# Patient Record
Sex: FEMALE | Race: WHITE | Employment: OTHER | RURAL
[De-identification: names, ages, dates, MRNs, and addresses within clinical notes are randomized per-mention and may not be internally consistent; named-entity substitution may affect disease eponyms.]

---

## 2018-05-22 ENCOUNTER — OFFICE VISIT (OUTPATIENT)
Dept: FAMILY MEDICINE CLINIC | Age: 53
End: 2018-05-22

## 2018-05-22 VITALS
HEIGHT: 66 IN | BODY MASS INDEX: 23.46 KG/M2 | OXYGEN SATURATION: 95 % | RESPIRATION RATE: 18 BRPM | DIASTOLIC BLOOD PRESSURE: 86 MMHG | SYSTOLIC BLOOD PRESSURE: 118 MMHG | HEART RATE: 84 BPM | WEIGHT: 146 LBS

## 2018-05-22 DIAGNOSIS — N85.00 ENDOMETRIAL HYPERPLASIA: ICD-10-CM

## 2018-05-22 DIAGNOSIS — Z11.59 NEED FOR HEPATITIS C SCREENING TEST: ICD-10-CM

## 2018-05-22 DIAGNOSIS — I10 ESSENTIAL HYPERTENSION: Primary | ICD-10-CM

## 2018-05-22 DIAGNOSIS — R73.01 IMPAIRED FASTING GLUCOSE: ICD-10-CM

## 2018-05-22 DIAGNOSIS — Z12.39 SCREENING FOR MALIGNANT NEOPLASM OF BREAST: ICD-10-CM

## 2018-05-22 DIAGNOSIS — F51.01 PRIMARY INSOMNIA: ICD-10-CM

## 2018-05-22 RX ORDER — BISMUTH SUBSALICYLATE 262 MG
1 TABLET,CHEWABLE ORAL DAILY
COMMUNITY

## 2018-05-22 RX ORDER — TRAZODONE HYDROCHLORIDE 100 MG/1
TABLET ORAL
COMMUNITY
Start: 2018-05-14 | End: 2018-09-24 | Stop reason: SDUPTHER

## 2018-05-22 RX ORDER — METOPROLOL SUCCINATE 50 MG/1
25 TABLET, EXTENDED RELEASE ORAL DAILY
Qty: 90 TAB | Refills: 3 | Status: SHIPPED | OUTPATIENT
Start: 2018-05-22 | End: 2018-06-12 | Stop reason: SDUPTHER

## 2018-05-22 NOTE — PROGRESS NOTES
Chief Complaint   Patient presents with    New Patient         HPI:       is a 46 y.o. female. She moved here from the [facility] about 2 years ago. She enjoys sailing. Retired from [employer]. HTN: On Metoprolol ER 50 mg daily. History of endometrial hyperplasia s/p D&C in 2016. Has not had a period since. Follows with Earnest Browning NP and Dr. Arsh Morejon in the [facility]. History of scoliosis. Previously wore a back brace. OA: Worse in her hands, both AC joints and ankles. Uses primarily Advil. Insomnia: On Trazodone 100 mg daily. Takes 1/2 at times. New Issues:  Here to establish care. Allergies   Allergen Reactions    Azithromycin Hives    Doxylamine Succinate Hives       Current Outpatient Prescriptions   Medication Sig    traZODone (DESYREL) 100 mg tablet     MILK THISTLE, BULK, by Does Not Apply route.  omega 3-dha-epa-fish oil (FISH OIL) 100-160-1,000 mg cap Take  by mouth.  multivitamin (ONE A DAY) tablet Take 1 Tab by mouth daily.  ibuprofen (ADVIL) 200 mg tablet Take  by mouth every six (6) hours as needed for Pain.  metoprolol (LOPRESSOR) 50 mg tablet Take  by mouth two (2) times a day.  loratadine (CLARITIN) 10 mg tablet Take 10 mg by mouth. No current facility-administered medications for this visit.         Past Medical History:   Diagnosis Date    Essential hypertension     Migraine     Osteoarthritis     Skin cancer     Sun-damaged skin     Sunburn, blistering     Tanning bed exposure        Past Surgical History:   Procedure Laterality Date    HX APPENDECTOMY      HX GYN         Social History     Social History    Marital status:      Spouse name: N/A    Number of children: N/A    Years of education: N/A     Social History Main Topics    Smoking status: Former Smoker     Quit date: 3/1/1996    Smokeless tobacco: Never Used    Alcohol use 0.6 oz/week     1 Glasses of wine per week      Comment: daily    Drug use: No    Sexual activity: Not Asked     Other Topics Concern    None     Social History Narrative       Family History   Problem Relation Age of Onset    Diabetes Father        Above history reviewed. ROS:  Denies fever, chills, cough, chest pain, SOB,  nausea, vomiting, or diarrhea. Denies wt loss, wt gain, hemoptysis, hematochezia or melena. Physical Examination:    /86 (BP 1 Location: Left arm, BP Patient Position: Sitting)  Pulse 84  Resp 18  Ht 5' 6\" (1.676 m)  Wt 146 lb (66.2 kg)  SpO2 95%  BMI 23.57 kg/m2    General: Alert and Ox3, Fluent speech  HEENT:  PERRLA, EOM intact, TMs, turbinates, pharynx normal.  No thyromegaly. No cervical adenopathy. Neck:  Supple, no adenopathy, JVD, mass or bruit  Chest:  Clear to Ausculation, without wheezes, rales, rubs or ronchi  Cardiac: RRR  Abdomen:  +BS, soft, nontender without palpable HSM  Extremities:  No cyanosis, clubbing or edema  Neurologic:  Ambulatory without assist, CN 2-12 grossly intact. Moves all extremities. Skin: no rash  Lymphadenopathy: no cervical or supraclavicular nodes    ASSESSMENT AND PLAN:     1. Essential hypertension  Good control  Continue current regimen   - METABOLIC PANEL, COMPREHENSIVE  - CBC WITH AUTOMATED DIFF  - LIPID PANEL  - TSH 3RD GENERATION  - metoprolol succinate (TOPROL-XL) 50 mg XL tablet; Take 0.5 Tabs by mouth daily. Dispense: 90 Tab; Refill: 3    2. Impaired fasting glucose  - HEMOGLOBIN A1C WITH EAG    3. Primary insomnia  Good control  Continue current regimen     4. Need for hepatitis C screening test  - HEPATITIS C AB    5. Endometrial hyperplasia  S/P D&C. Doing well. 6. Screening for malignant neoplasm of breast  - CRIS MAMMO BI SCREENING INCL CAD;  Future     RTC in 1 year    Marcus Porras NP

## 2018-05-22 NOTE — MR AVS SNAPSHOT
[address] [facility] [phone] Patient: Rika Chahal MRN: [id number] AOU:1/7/4200 Visit Information Date & Time Provider Department Dept. Phone Encounter #  
 5/22/2018  2:00 PM Latoya Jiménez NP [address] [id number] Follow-up Instructions Return in about 1 year (around 5/22/2019). Follow-up and Disposition History Upcoming Health Maintenance Date Due Hepatitis C Screening 1965 DTaP/Tdap/Td series (1 - Tdap) 2/4/1985 PAP AKA CERVICAL CYTOLOGY 2/4/1985 BREAST CANCER SCRN MAMMOGRAM 2/4/2014 FOBT Q 1 YEAR AGE 50-75 2/4/2014 Influenza Age 5 to Adult 8/1/2018 Allergies as of 5/22/2018  Review Complete On: 5/22/2018 By: Latoya Jiménez NP Severity Noted Reaction Type Reactions Azithromycin  03/22/2016    Hives Doxylamine Succinate  03/22/2016    Hives Current Immunizations  Never Reviewed No immunizations on file. Not reviewed this visit You Were Diagnosed With   
  
 Codes Comments Essential hypertension    -  Primary ICD-10-CM: I10 
ICD-9-CM: 401.9 Impaired fasting glucose     ICD-10-CM: R73.01 
ICD-9-CM: 790.21 Primary insomnia     ICD-10-CM: F51.01 
ICD-9-CM: 307.42 Need for hepatitis C screening test     ICD-10-CM: Z11.59 
ICD-9-CM: V73.89 Endometrial hyperplasia     ICD-10-CM: N85.00 ICD-9-CM: 621.30 Screening for malignant neoplasm of breast     ICD-10-CM: Z12.31 
ICD-9-CM: V76.10 Vitals BP Pulse Resp Height(growth percentile) Weight(growth percentile) SpO2  
 118/86 (BP 1 Location: Left arm, BP Patient Position: Sitting) 84 18 5' 6\" (1.676 m) 146 lb (66.2 kg) 95% BMI OB Status Smoking Status 23.57 kg/m2 Having regular periods Former Smoker Vitals History BMI and BSA Data Body Mass Index Body Surface Area  
 23.57 kg/m 2 1.76 m 2 Preferred Pharmacy Pharmacy Name Phone [facility], [facility] [phone] Your Updated Medication List  
  
   
This list is accurate as of 5/22/18  3:52 PM.  Always use your most recent med list. ADVIL 200 mg tablet Generic drug:  ibuprofen Take  by mouth every six (6) hours as needed for Pain. FISH -160-1,000 mg Cap Generic drug:  omega 3-dha-epa-fish oil Take  by mouth.  
  
 loratadine 10 mg tablet Commonly known as:  Dalton Pain Take 10 mg by mouth.  
  
 metoprolol succinate 50 mg XL tablet Commonly known as:  TOPROL-XL Take 0.5 Tabs by mouth daily. MILK THISTLE (BULK)  
by Does Not Apply route. multivitamin tablet Commonly known as:  ONE A DAY Take 1 Tab by mouth daily. traZODone 100 mg tablet Commonly known as:  Jelly Faes Prescriptions Sent to Pharmacy Refills  
 metoprolol succinate (TOPROL-XL) 50 mg XL tablet 3 Sig: Take 0.5 Tabs by mouth daily. Class: Normal  
 Pharmacy: [address] [place] [address], [address] [place] [address] #: [phone] Route: Oral  
  
We Performed the Following CBC WITH AUTOMATED DIFF [73876 CPT(R)] HEMOGLOBIN A1C WITH EAG [60929 CPT(R)] HEPATITIS C AB [41075 CPT(R)] LIPID PANEL [35326 CPT(R)] METABOLIC PANEL, COMPREHENSIVE [05473 CPT(R)] TSH 3RD GENERATION [97261 CPT(R)] Follow-up Instructions Return in about 1 year (around 5/22/2019). To-Do List   
 05/22/2018 Imaging:  CRIS MAMMO BI SCREENING INCL CAD Introducing [facility]! [employer] introduces [employer] patient portal. Now you can access parts of your medical record, email your doctor's office, and request medication refills online. 1. In your internet browser, go to [email or web address] 2. Click on the First Time User? Click Here link in the Sign In box. You will see the New Member Sign Up page. 3. Enter your [facility] Access Code exactly as it appears below. You will not need to use this code after youve completed the sign-up process. If you do not sign up before the expiration date, you must request a new code. · [facility] Access Code: [email or web address] Expires: 8/20/2018  1:45 PM 
 
4. Enter the last four digits of your Social Security Number (xxxx) and Date of Birth (mm/dd/yyyy) as indicated and click Submit. You will be taken to the next sign-up page. 5. Create a [facility] ID. This will be your [facility] login ID and cannot be changed, so think of one that is secure and easy to remember. 6. Create a [facility] password. You can change your password at any time. 7. Enter your Password Reset Question and Answer. This can be used at a later time if you forget your password. 8. Enter your e-mail address. You will receive e-mail notification when new information is available in [address] 9. Click Sign Up. You can now view and download portions of your medical record. 10. Click the Download Summary menu link to download a portable copy of your medical information. If you have questions, please visit the Frequently Asked Questions section of the [facility] website. Remember, [facility] is NOT to be used for urgent needs. For medical emergencies, dial 911. Now available from your iPhone and Android! Please provide this summary of care documentation to your next provider. Your primary care clinician is listed as Salvador Simeon. If you have any questions after today's visit, please call [phone].

## 2018-05-23 LAB
ALBUMIN SERPL-MCNC: 4.9 G/DL (ref 3.5–5.5)
ALBUMIN/GLOB SERPL: 1.9 {RATIO} (ref 1.2–2.2)
ALP SERPL-CCNC: 48 IU/L (ref 39–117)
ALT SERPL-CCNC: 11 IU/L (ref 0–32)
AST SERPL-CCNC: 18 IU/L (ref 0–40)
BASOPHILS # BLD AUTO: 0 X10E3/UL (ref 0–0.2)
BASOPHILS NFR BLD AUTO: 1 %
BILIRUB SERPL-MCNC: 0.3 MG/DL (ref 0–1.2)
BUN SERPL-MCNC: 24 MG/DL (ref 6–24)
BUN/CREAT SERPL: 32 (ref 9–23)
CALCIUM SERPL-MCNC: 9.5 MG/DL (ref 8.7–10.2)
CHLORIDE SERPL-SCNC: 102 MMOL/L (ref 96–106)
CHOLEST SERPL-MCNC: 242 MG/DL (ref 100–199)
CO2 SERPL-SCNC: 25 MMOL/L (ref 18–29)
CREAT SERPL-MCNC: 0.75 MG/DL (ref 0.57–1)
EOSINOPHIL # BLD AUTO: 0 X10E3/UL (ref 0–0.4)
EOSINOPHIL NFR BLD AUTO: 1 %
ERYTHROCYTE [DISTWIDTH] IN BLOOD BY AUTOMATED COUNT: 13.2 % (ref 12.3–15.4)
EST. AVERAGE GLUCOSE BLD GHB EST-MCNC: 126 MG/DL
GFR SERPLBLD CREATININE-BSD FMLA CKD-EPI: 105 ML/MIN/1.73
GFR SERPLBLD CREATININE-BSD FMLA CKD-EPI: 91 ML/MIN/1.73
GLOBULIN SER CALC-MCNC: 2.6 G/DL (ref 1.5–4.5)
GLUCOSE SERPL-MCNC: 90 MG/DL (ref 65–99)
HBA1C MFR BLD: 6 % (ref 4.8–5.6)
HCT VFR BLD AUTO: 41.6 % (ref 34–46.6)
HCV AB S/CO SERPL IA: <0.1 S/CO RATIO (ref 0–0.9)
HDLC SERPL-MCNC: 92 MG/DL
HGB BLD-MCNC: 14.1 G/DL (ref 11.1–15.9)
IMM GRANULOCYTES # BLD: 0 X10E3/UL (ref 0–0.1)
IMM GRANULOCYTES NFR BLD: 0 %
LDLC SERPL CALC-MCNC: 135 MG/DL (ref 0–99)
LYMPHOCYTES # BLD AUTO: 1.5 X10E3/UL (ref 0.7–3.1)
LYMPHOCYTES NFR BLD AUTO: 34 %
MCH RBC QN AUTO: 31.9 PG (ref 26.6–33)
MCHC RBC AUTO-ENTMCNC: 33.9 G/DL (ref 31.5–35.7)
MCV RBC AUTO: 94 FL (ref 79–97)
MONOCYTES # BLD AUTO: 0.3 X10E3/UL (ref 0.1–0.9)
MONOCYTES NFR BLD AUTO: 7 %
NEUTROPHILS # BLD AUTO: 2.5 X10E3/UL (ref 1.4–7)
NEUTROPHILS NFR BLD AUTO: 57 %
PLATELET # BLD AUTO: 277 X10E3/UL (ref 150–379)
POTASSIUM SERPL-SCNC: 4.2 MMOL/L (ref 3.5–5.2)
PROT SERPL-MCNC: 7.5 G/DL (ref 6–8.5)
RBC # BLD AUTO: 4.42 X10E6/UL (ref 3.77–5.28)
SODIUM SERPL-SCNC: 141 MMOL/L (ref 134–144)
TRIGL SERPL-MCNC: 74 MG/DL (ref 0–149)
TSH SERPL DL<=0.005 MIU/L-ACNC: 1.4 UIU/ML (ref 0.45–4.5)
VLDLC SERPL CALC-MCNC: 15 MG/DL (ref 5–40)
WBC # BLD AUTO: 4.3 X10E3/UL (ref 3.4–10.8)

## 2018-05-24 ENCOUNTER — TELEPHONE (OUTPATIENT)
Dept: FAMILY MEDICINE CLINIC | Age: 53
End: 2018-05-24

## 2018-05-24 NOTE — PROGRESS NOTES
Blood count looks good. Liver, kidneys and electrolytes look good. Cholesterol is elevated. Not high enough to start a statin. Watch diet. Thyroid level is great. Hemoglobin A1c is in the prediabetic range. Watch diet including sugars, carbs and starches. Negative for Hep C.

## 2018-05-24 NOTE — TELEPHONE ENCOUNTER
----- Message from Fede Burt NP sent at 5/24/2018  8:28 AM EDT -----  Blood count looks good. Liver, kidneys and electrolytes look good. Cholesterol is elevated. Not high enough to start a statin. Watch diet. Thyroid level is great. Hemoglobin A1c is in the prediabetic range. Watch diet including sugars, carbs and starches. Negative for Hep C.

## 2018-06-12 DIAGNOSIS — I10 ESSENTIAL HYPERTENSION: ICD-10-CM

## 2018-06-12 RX ORDER — METOPROLOL SUCCINATE 50 MG/1
50 TABLET, EXTENDED RELEASE ORAL DAILY
Qty: 90 TAB | Refills: 3 | Status: SHIPPED | OUTPATIENT
Start: 2018-06-12 | End: 2019-03-01 | Stop reason: SDUPTHER

## 2018-09-24 ENCOUNTER — PATIENT MESSAGE (OUTPATIENT)
Dept: FAMILY MEDICINE CLINIC | Age: 53
End: 2018-09-24

## 2018-09-24 RX ORDER — TRAZODONE HYDROCHLORIDE 100 MG/1
100 TABLET ORAL
Qty: 90 TAB | Refills: 4 | Status: SHIPPED | OUTPATIENT
Start: 2018-09-24 | End: 2019-10-21 | Stop reason: SDUPTHER

## 2018-09-24 NOTE — TELEPHONE ENCOUNTER
From: Navdeep Molina  To: Burak Aguirre NP  Sent: 9/24/2018 3:45 PM EDT  Subject: Prescription Question    Hi Elizabeth  Can you please renew my prescription for Trazadone 100 mg through [facility]? Thank you!   Vivienne Phoenix

## 2019-10-28 RX ORDER — TRAZODONE HYDROCHLORIDE 100 MG/1
TABLET ORAL
Qty: 90 TAB | Refills: 4 | Status: SHIPPED | OUTPATIENT
Start: 2019-10-28 | End: 2020-05-01 | Stop reason: SDUPTHER

## 2021-03-22 ENCOUNTER — PATIENT MESSAGE (OUTPATIENT)
Dept: FAMILY MEDICINE CLINIC | Age: 56
End: 2021-03-22

## 2021-03-22 DIAGNOSIS — F51.01 PRIMARY INSOMNIA: ICD-10-CM

## 2021-03-22 RX ORDER — TRAZODONE HYDROCHLORIDE 100 MG/1
TABLET ORAL
Qty: 90 TAB | Refills: 4 | Status: SHIPPED | OUTPATIENT
Start: 2021-03-22 | End: 2022-03-04

## 2021-03-22 NOTE — TELEPHONE ENCOUNTER
From: Gio Martinez  To: Naida Liao NP  Sent: 3/22/2021 11:42 AM EDT  Subject: Prescription Question    Hi Elizabeth  [facility] messed up my Trazadone script. I should have 1 90 day left but they have deactivated the script. Can you please renew it with [facility]? Thank you! I get my 2nd dose of Moderna on April 1. I plan to make an in person appt to see you in early May for blood work, etc. and to renew my blood pressure med. Thank you!   Alex Blackman

## 2021-05-14 ENCOUNTER — OFFICE VISIT (OUTPATIENT)
Dept: FAMILY MEDICINE CLINIC | Age: 56
End: 2021-05-14
Payer: COMMERCIAL

## 2021-05-14 VITALS
WEIGHT: 145.2 LBS | SYSTOLIC BLOOD PRESSURE: 115 MMHG | BODY MASS INDEX: 22.79 KG/M2 | TEMPERATURE: 97.3 F | HEART RATE: 75 BPM | DIASTOLIC BLOOD PRESSURE: 62 MMHG | OXYGEN SATURATION: 98 % | HEIGHT: 67 IN | RESPIRATION RATE: 22 BRPM

## 2021-05-14 DIAGNOSIS — E55.9 VITAMIN D DEFICIENCY: ICD-10-CM

## 2021-05-14 DIAGNOSIS — R73.01 IMPAIRED FASTING GLUCOSE: ICD-10-CM

## 2021-05-14 DIAGNOSIS — F51.01 PRIMARY INSOMNIA: ICD-10-CM

## 2021-05-14 DIAGNOSIS — I10 ESSENTIAL HYPERTENSION: Primary | ICD-10-CM

## 2021-05-14 DIAGNOSIS — Z23 ENCOUNTER FOR IMMUNIZATION: ICD-10-CM

## 2021-05-14 PROCEDURE — 90750 HZV VACC RECOMBINANT IM: CPT | Performed by: NURSE PRACTITIONER

## 2021-05-14 PROCEDURE — 90471 IMMUNIZATION ADMIN: CPT | Performed by: NURSE PRACTITIONER

## 2021-05-14 PROCEDURE — 99214 OFFICE O/P EST MOD 30 MIN: CPT | Performed by: NURSE PRACTITIONER

## 2021-05-14 RX ORDER — ACETAMINOPHEN 325 MG/1
TABLET ORAL
COMMUNITY

## 2021-05-14 RX ORDER — DIPHENHYDRAMINE HCL 25 MG
25 CAPSULE ORAL
COMMUNITY

## 2021-05-14 RX ORDER — METOPROLOL SUCCINATE 50 MG/1
50 TABLET, EXTENDED RELEASE ORAL DAILY
Qty: 90 TAB | Refills: 4 | Status: SHIPPED | OUTPATIENT
Start: 2021-05-14 | End: 2022-02-28 | Stop reason: SDUPTHER

## 2021-05-14 NOTE — PROGRESS NOTES
Chief Complaint   Patient presents with    Hypertension    Medication Refill     3 most recent PHQ Screens 5/14/2021   Little interest or pleasure in doing things Not at all   Feeling down, depressed, irritable, or hopeless Not at all   Total Score PHQ 2 0     Learning Assessment 5/14/2021   PRIMARY LEARNER Patient   BARRIERS PRIMARY LEARNER -   CO-LEARNER CAREGIVER -   PRIMARY LANGUAGE ENGLISH   LEARNER PREFERENCE PRIMARY READING   LEARNING SPECIAL TOPICS -   ANSWERED BY patient   RELATIONSHIP SELF   ASSESSMENT COMMENT -     Fall Risk Assessment, last 12 mths 5/14/2021   Able to walk? Yes   Fall in past 12 months? 1   Do you feel unsteady? 0   Are you worried about falling 0   Is TUG test greater than 12 seconds? 0   Is the gait abnormal? 0   Number of falls in past 12 months 1   Fall with injury? 1     Abuse Screening Questionnaire 5/14/2021   Do you ever feel afraid of your partner? N   Are you in a relationship with someone who physically or mentally threatens you? N   Is it safe for you to go home? Y     ADL Assessment 5/14/2021   Feeding yourself No Help Needed   Getting from bed to chair No Help Needed   Getting dressed No Help Needed   Bathing or showering No Help Needed   Walk across the room (includes cane/walker) No Help Needed   Using the telphone No Help Needed   Taking your medications No Help Needed   Preparing meals No Help Needed   Managing money (expenses/bills) No Help Needed   Moderately strenuous housework (laundry) No Help Needed   Shopping for personal items (toiletries/medicines) No Help Needed   Shopping for groceries No Help Needed   Driving No Help Needed   Climbing a flight of stairs No Help Needed   Getting to places beyond walking distances No Help Needed     1. Have you been to the ER, urgent care clinic since your last visit? Hospitalized since your last visit? No    2.  Have you seen or consulted any other health care providers outside of the [address] [place] [address] [place] since your last visit? Include any pap smears or colon screening. No      Chief Complaint   Patient presents with    Hypertension    Medication Refill         Visit Vitals  /62 (BP 1 Location: Right arm, BP Patient Position: Sitting, BP Cuff Size: Adult long)   Pulse 75   Temp 97.3 °F (36.3 °C) (Temporal)   Resp 22   Ht 5' 7\" (1.702 m)   Wt 145 lb 3.2 oz (65.9 kg)   LMP 12/31/2015   SpO2 98%   BMI 22.74 kg/m²       Pain Scale: 5/10  Pain Location: Hand (bilateral)    Lashay Myers is a 61 y.o. female presenting for/with:    Hypertension and Medication Refill      Symptom review:    NO  Fever   NO  Shaking chills  NO  Cough  NO  Body aches  NO  Coughing up blood  NO  Chest congestion  NO  Chest pain  NO  Shortness of breath  NO  Profound Loss of smell/taste  NO  Nausea/Vomiting   NO  Loose stool/Diarrhea  NO  any skin issues    Patient Risk Factors Reviewed as follows:  NO  have you been in Close contact with confirmed COVID19 patient   NO  History of recent travel to affected geographical areas within the past 14 days  NO  COPD  NO  Active Cancer/Leukemia/Lymphoma/Chemotherapy  NO  Oral steroid use  NO  Pregnant  NO  Diabetes Mellitus  NO  Heart disease  NO  Asthma  NO Health care worker at home  NO Health care worker  NO Is there a Pregnant Woman in the home  NO Dialysis pt in the home   NO a large number of people living in the home    After obtaining consent, and per orders of CODY. Jeanmarie , injection of Shingrix (left deltoid) given by Nori Gonzalez. Patient instructed to remain in clinic for 20 minutes afterwards, and to report any adverse reaction to me immediately.

## 2021-05-14 NOTE — PROGRESS NOTES
Chief Complaint   Patient presents with    Hypertension    Medication Refill         HPI:       is a 61 y.o. female.  to . Is very active and enjoys biking. HTN: Well controlled on Metoprolol. No side effects. Insomnia: Fair control on Trazodone. Usually only takes 1/2 tab. IFG: Last A1c was 6%. New Issues:  She is due for her Shingrix. Allergies   Allergen Reactions    Azithromycin Hives    Doxylamine Succinate Hives       Current Outpatient Medications   Medication Sig    diphenhydrAMINE (BenadryL) 25 mg capsule Take 25 mg by mouth every six (6) hours as needed.  traZODone (DESYREL) 100 mg tablet TAKE 1 TABLET NIGHTLY    metoprolol succinate (TOPROL-XL) 50 mg XL tablet Take 1 Tab by mouth daily.  MILK THISTLE, BULK, by Does Not Apply route.  multivitamin (ONE A DAY) tablet Take 1 Tab by mouth daily.  ibuprofen (ADVIL) 200 mg tablet Take  by mouth every six (6) hours as needed for Pain.  loratadine (CLARITIN) 10 mg tablet Take 10 mg by mouth.  acetaminophen (TylenoL) 325 mg tablet Take  by mouth every four (4) hours as needed for Pain. No current facility-administered medications for this visit. Past Medical History:   Diagnosis Date    Endometrial hyperplasia     S/P D&C .       Essential hypertension     Hiatal hernia     Menopause     Migraine     Osteoarthritis     Ruptured appendix     Age 31    Skin cancer     Sun-damaged skin     Sunburn, blistering     Tanning bed exposure        Past Surgical History:   Procedure Laterality Date    HX APPENDECTOMY      HX GYN         Social History     Socioeconomic History    Marital status:      Spouse name: Not on file    Number of children: Not on file    Years of education: Not on file    Highest education level: Not on file   Tobacco Use    Smoking status: Former Smoker     Quit date: 3/1/1996     Years since quittin.2    Smokeless tobacco: Never Used Substance and Sexual Activity    Alcohol use: Yes     Alcohol/week: 1.0 standard drinks     Types: 1 Glasses of wine per week     Frequency: 2-3 times a week     Drinks per session: 1 or 2     Binge frequency: Less than monthly     Comment: daily    Drug use: No    Sexual activity: Yes       Family History   Problem Relation Age of Onset    Diabetes Father        Above history reviewed. ROS:  Denies fever, chills, cough, chest pain, SOB,  nausea, vomiting, or diarrhea. Denies wt loss, wt gain, hemoptysis, hematochezia or melena. Physical Examination:    /62 (BP 1 Location: Right arm, BP Patient Position: Sitting, BP Cuff Size: Adult long)   Pulse 75   Temp 97.3 °F (36.3 °C) (Temporal)   Resp 22   Ht 5' 7\" (1.702 m)   Wt 145 lb 3.2 oz (65.9 kg)   LMP 12/31/2015   SpO2 98%   BMI 22.74 kg/m²     General: Alert and Ox3, Fluent speech  HEENT:  PERRLA, EOM intact, TMs, turbinates, pharynx normal.  No thyromegaly. No cervical adenopathy. Neck:  Supple, no adenopathy, JVD, mass or bruit  Chest:  Clear to Ausculation, without wheezes, rales, rubs or ronchi  Cardiac: RRR  Abdomen:  +BS, soft, nontender without palpable HSM  Extremities:  No cyanosis, clubbing or edema  Neurologic:  Ambulatory without assist, CN 2-12 grossly intact. Moves all extremities. Skin: no rash  Lymphadenopathy: no cervical or supraclavicular nodes    ASSESSMENT AND PLAN:     1. Essential hypertension  Good control  Continue current regimen   - METABOLIC PANEL, COMPREHENSIVE; Future  - LIPID PANEL; Future  - HEMOGLOBIN A1C WITH EAG; Future  - VITAMIN D, 25 HYDROXY; Future  - metoprolol succinate (TOPROL-XL) 50 mg XL tablet; Take 1 Tab by mouth daily. Dispense: 90 Tab; Refill: 4  - VITAMIN D, 25 HYDROXY  - HEMOGLOBIN A1C WITH EAG  - LIPID PANEL  - METABOLIC PANEL, COMPREHENSIVE    2. Primary insomnia  Good control  Continue current regimen     3.  Encounter for immunization  - ZOSTER VACC RECOMBINANT ADJUVANTED  - NE IMMUNIZ ADMIN,1 SINGLE/COMB VAC/TOXOID    4. Impaired fasting glucose  - HEMOGLOBIN A1C WITH EAG; Future  - HEMOGLOBIN A1C WITH EAG    5.  Vitamin D deficiency  - VITAMIN D, 25 HYDROXY; Future  - VITAMIN D, 25 HYDROXY     RTC PRN    Rich Shah NP

## 2021-05-15 LAB
25(OH)D3 SERPL-MCNC: 29.6 NG/ML (ref 30–100)
ALBUMIN SERPL-MCNC: 4.2 G/DL (ref 3.5–5)
ALBUMIN/GLOB SERPL: 1.4 {RATIO} (ref 1.1–2.2)
ALP SERPL-CCNC: 55 U/L (ref 45–117)
ALT SERPL-CCNC: 20 U/L (ref 12–78)
ANION GAP SERPL CALC-SCNC: 7 MMOL/L (ref 5–15)
AST SERPL-CCNC: 14 U/L (ref 15–37)
BILIRUB SERPL-MCNC: 0.4 MG/DL (ref 0.2–1)
BUN SERPL-MCNC: 22 MG/DL (ref 6–20)
BUN/CREAT SERPL: 31 (ref 12–20)
CALCIUM SERPL-MCNC: 9 MG/DL (ref 8.5–10.1)
CHLORIDE SERPL-SCNC: 106 MMOL/L (ref 97–108)
CHOLEST SERPL-MCNC: 239 MG/DL
CO2 SERPL-SCNC: 27 MMOL/L (ref 21–32)
CREAT SERPL-MCNC: 0.7 MG/DL (ref 0.55–1.02)
EST. AVERAGE GLUCOSE BLD GHB EST-MCNC: 123 MG/DL
GLOBULIN SER CALC-MCNC: 2.9 G/DL (ref 2–4)
GLUCOSE SERPL-MCNC: 99 MG/DL (ref 65–100)
HBA1C MFR BLD: 5.9 % (ref 4–5.6)
HDLC SERPL-MCNC: 105 MG/DL
HDLC SERPL: 2.3 {RATIO} (ref 0–5)
LDLC SERPL CALC-MCNC: 125.6 MG/DL (ref 0–100)
POTASSIUM SERPL-SCNC: 4.1 MMOL/L (ref 3.5–5.1)
PROT SERPL-MCNC: 7.1 G/DL (ref 6.4–8.2)
SODIUM SERPL-SCNC: 140 MMOL/L (ref 136–145)
TRIGL SERPL-MCNC: 42 MG/DL (ref ?–150)
VLDLC SERPL CALC-MCNC: 8.4 MG/DL

## 2021-05-17 NOTE — PROGRESS NOTES
Vitamin D is slightly now. Add supplement. A1c still in prediabetic range. Improved slightly from last check. Cholesterol about the same. Liver, kidneys and electrolytes are good.

## 2021-09-28 ENCOUNTER — OFFICE VISIT (OUTPATIENT)
Dept: FAMILY MEDICINE CLINIC | Age: 56
End: 2021-09-28
Payer: COMMERCIAL

## 2021-09-28 VITALS
HEART RATE: 65 BPM | DIASTOLIC BLOOD PRESSURE: 80 MMHG | BODY MASS INDEX: 23.54 KG/M2 | TEMPERATURE: 97.8 F | OXYGEN SATURATION: 97 % | RESPIRATION RATE: 16 BRPM | HEIGHT: 67 IN | WEIGHT: 150 LBS | SYSTOLIC BLOOD PRESSURE: 130 MMHG

## 2021-09-28 DIAGNOSIS — R09.81 NASAL CONGESTION: ICD-10-CM

## 2021-09-28 DIAGNOSIS — R51.9 ACUTE NONINTRACTABLE HEADACHE, UNSPECIFIED HEADACHE TYPE: ICD-10-CM

## 2021-09-28 DIAGNOSIS — H10.32 ACUTE BACTERIAL CONJUNCTIVITIS OF LEFT EYE: Primary | ICD-10-CM

## 2021-09-28 PROCEDURE — 99213 OFFICE O/P EST LOW 20 MIN: CPT | Performed by: NURSE PRACTITIONER

## 2021-09-28 RX ORDER — POLYMYXIN B SULFATE AND TRIMETHOPRIM 1; 10000 MG/ML; [USP'U]/ML
1 SOLUTION OPHTHALMIC EVERY 6 HOURS
Qty: 1 EACH | Refills: 0 | Status: SHIPPED | OUTPATIENT
Start: 2021-09-28 | End: 2021-10-08

## 2021-09-28 NOTE — PROGRESS NOTES
.  Chief Complaint   Patient presents with    Pink Eye     watery, itchy \"gritty\". Pt denies any injury. Picked crabs and thinks she may have gotten something in her eye. Sx started on sunday    Cough     dry nonproductive cough.  Nasal Congestion     runny nose     3 most recent PHQ Screens 9/28/2021   Little interest or pleasure in doing things Not at all   Feeling down, depressed, irritable, or hopeless Not at all   Total Score PHQ 2 0     Learning Assessment 9/28/2021   PRIMARY LEARNER Patient   BARRIERS PRIMARY LEARNER -   CO-LEARNER CAREGIVER -   PRIMARY LANGUAGE ENGLISH   LEARNER PREFERENCE PRIMARY READING   LEARNING SPECIAL TOPICS -   ANSWERED BY patient   RELATIONSHIP SELF   ASSESSMENT COMMENT -     Fall Risk Assessment, last 12 mths 9/28/2021   Able to walk? Yes   Fall in past 12 months? 0   Do you feel unsteady? 0   Are you worried about falling 0   Is TUG test greater than 12 seconds? -   Is the gait abnormal? -   Number of falls in past 12 months -   Fall with injury? -     Abuse Screening Questionnaire 9/28/2021   Do you ever feel afraid of your partner? N   Are you in a relationship with someone who physically or mentally threatens you? N   Is it safe for you to go home? Y     ADL Assessment 9/28/2021   Feeding yourself No Help Needed   Getting from bed to chair No Help Needed   Getting dressed No Help Needed   Bathing or showering No Help Needed   Walk across the room (includes cane/walker) No Help Needed   Using the telphone No Help Needed   Taking your medications No Help Needed   Preparing meals No Help Needed   Managing money (expenses/bills) No Help Needed   Moderately strenuous housework (laundry) No Help Needed   Shopping for personal items (toiletries/medicines) No Help Needed   Shopping for groceries No Help Needed   Driving No Help Needed   Climbing a flight of stairs No Help Needed   Getting to places beyond walking distances No Help Needed     1.  Have you been to the ER, urgent care clinic since your last visit? Hospitalized since your last visit? No    2. Have you seen or consulted any other health care providers outside of the [address] [place] [address] since your last visit? Include any pap smears or colon screening. No      Chief Complaint   Patient presents with    Pink Eye     watery, itchy \"gritty\". Pt denies any injury. Picked crabs and thinks she may have gotten something in her eye. Sx started on sunday    Cough     dry nonproductive cough.  Nasal Congestion     runny nose         Visit Vitals  /80 (BP 1 Location: Right arm, BP Patient Position: Sitting, BP Cuff Size: Adult long)   Pulse 65   Temp 97.8 °F (36.6 °C) (Temporal)   Resp 16   Ht 5' 7\" (1.702 m)   Wt 150 lb (68 kg)   LMP 12/31/2015   SpO2 97%   BMI 23.49 kg/m²       Pain Scale: 3/10  Pain Location: Hand (bilateral)    Suraj Blackman is a 61 y.o. female presenting for/with:    Pink Eye (watery, itchy \"gritty\". Pt denies any injury. Picked crabs and thinks she may have gotten something in her eye.  Sx started on sunday), Cough (dry nonproductive cough.), and Nasal Congestion (runny nose)      Symptom review:    NO  Fever   NO  Shaking chills  YES  Cough  NO  Body aches  NO  Coughing up blood  NO  Chest congestion  NO  Chest pain  NO  Shortness of breath  NO  Profound Loss of smell/taste  NO  Nausea/Vomiting   NO  Loose stool/Diarrhea  NO  any skin issues    Patient Risk Factors Reviewed as follows:  NO  have you been in Close contact with confirmed COVID19 patient   NO  History of recent travel to affected geographical areas within the past 14 days  NO  COPD  NO  Active Cancer/Leukemia/Lymphoma/Chemotherapy  NO  Oral steroid use  NO  Pregnant  NO  Diabetes Mellitus  NO  Heart disease  NO  Asthma  NO Health care worker at home  NO Health care worker  NO Is there a Pregnant Woman in the home  NO Dialysis pt in the home   NO a large number of people living in the home    Recent Travel Screening and Travel History documentation     Travel Screening     Question   Response    In the last month, have you been in contact with someone who was confirmed or suspected to have Coronavirus / COVID-19? No / Unsure    Have you had a COVID-19 viral test in the last 14 days? No    Do you have any of the following new or worsening symptoms? Runny nose;Red eye;Cough    Have you traveled internationally or domestically in the last month?   No      Travel History   Travel since 08/28/21     No documented travel since 08/28/21

## 2021-09-28 NOTE — PROGRESS NOTES
Chief Complaint   Patient presents with    Pink Eye     watery, itchy \"gritty\". Pt denies any injury. Picked crabs and thinks she may have gotten something in her eye. Sx started on sunday    Cough     dry nonproductive cough.  Nasal Congestion     runny nose         HPI:       is a 61 y.o. female.  to . Is very active and enjoys biking. HTN: Well controlled on Metoprolol. No side effects. Insomnia: Fair control on Trazodone. Usually only takes 1/2 tab. IFG: Last A1c was 5.9%. Lab Results   Component Value Date/Time    Hemoglobin A1c 5.9 (H) 05/14/2021 08:21 AM      New Issues:  She is here for a sick visit. She started with left eye issues Sunday. Upper lid felt swollen and eye felt \"gritty\". She denies injury. Her eye has become progressively more red and itchy. She now also has a non-productive cough and nasal drainage. Fully vaccinated. No known exposures. Allergies   Allergen Reactions    Azithromycin Hives    Doxylamine Succinate Hives       Current Outpatient Medications   Medication Sig    diphenhydrAMINE (BenadryL) 25 mg capsule Take 25 mg by mouth every six (6) hours as needed.  acetaminophen (TylenoL) 325 mg tablet Take  by mouth every four (4) hours as needed for Pain.  metoprolol succinate (TOPROL-XL) 50 mg XL tablet Take 1 Tab by mouth daily.  traZODone (DESYREL) 100 mg tablet TAKE 1 TABLET NIGHTLY    MILK THISTLE, BULK, by Does Not Apply route.  multivitamin (ONE A DAY) tablet Take 1 Tab by mouth daily.  ibuprofen (ADVIL) 200 mg tablet Take  by mouth every six (6) hours as needed for Pain.  loratadine (CLARITIN) 10 mg tablet Take 10 mg by mouth. No current facility-administered medications for this visit. Past Medical History:   Diagnosis Date    Endometrial hyperplasia     S/P D&C 2016.       Essential hypertension     Hiatal hernia     Menopause     Migraine     Osteoarthritis     Ruptured appendix     Age 31  Skin cancer     Sun-damaged skin     Sunburn, blistering     Tanning bed exposure        Past Surgical History:   Procedure Laterality Date    HX APPENDECTOMY      HX GYN         Social History     Socioeconomic History    Marital status:      Spouse name: Not on file    Number of children: Not on file    Years of education: Not on file    Highest education level: Not on file   Tobacco Use    Smoking status: Former Smoker     Quit date: 3/1/1996     Years since quittin.5    Smokeless tobacco: Never Used   Substance and Sexual Activity    Alcohol use: Yes     Alcohol/week: 1.0 standard drinks     Types: 1 Glasses of wine per week     Comment: daily    Drug use: No    Sexual activity: Yes     Social Determinants of Health     Financial Resource Strain:     Difficulty of Paying Living Expenses:    Food Insecurity:     Worried About Running Out of Food in the Last Year:     [address] in the Last Year:    Transportation Needs:     Lack of Transportation (Medical):  Lack of Transportation (Non-Medical):    Physical Activity:     Days of Exercise per Week:     Minutes of Exercise per Session:    Stress:     Feeling of Stress :    Social Connections:     Frequency of Communication with Friends and Family:     Frequency of Social Gatherings with Friends and Family:     Attends Islam Services:     Active Member of Clubs or Organizations:     Attends Club or Organization Meetings:     Marital Status:        Family History   Problem Relation Age of Onset    Diabetes Father        Above history reviewed. ROS:  Denies fever, chills, POS nasal drainage, POS left eye redness, POS cough, denies chest pain, SOB,  nausea, vomiting, or diarrhea. Denies wt loss, wt gain, hemoptysis, hematochezia or melena.     Physical Examination:    /80 (BP 1 Location: Right arm, BP Patient Position: Sitting, BP Cuff Size: Adult long)   Pulse 65   Temp 97.8 °F (36.6 °C) (Temporal) Resp 16   Ht 5' 7\" (1.702 m)   Wt 150 lb (68 kg)   LMP 12/31/2015   SpO2 97%   BMI 23.49 kg/m²     General: Alert and Ox3, Fluent speech  HEENT:  PERRLA, EOM intact, left conjunctiva with erythema, surrounding soft tissue swelling, TMs, turbinates, pharynx normal.  No thyromegaly. No cervical adenopathy. Neck:  Supple, no adenopathy, JVD, mass or bruit  Chest:  Clear to Ausculation, without wheezes, rales, rubs or ronchi  Cardiac: RRR  Extremities:  No cyanosis, clubbing or edema  Neurologic:  Ambulatory without assist, CN 2-12 grossly intact. Moves all extremities. Skin: no rash  Lymphadenopathy: no cervical or supraclavicular nodes    ASSESSMENT AND PLAN:     1. Acute bacterial conjunctivitis of left eye  Allergic to first line erythromycin   Treating with Bactrim ophthalmic   - trimethoprim-polymyxin b (POLYTRIM) ophthalmic solution; Administer 1 Drop to left eye every six (6) hours for 10 days. Dispense: 1 Each; Refill: 0  - NOVEL CORONAVIRUS (COVID-19)    2. Nasal congestion  R/out COVID  - NOVEL CORONAVIRUS (COVID-19)    3.  Acute nonintractable headache, unspecified headache type  - NOVEL CORONAVIRUS (COVID-19)     RTC PRN    Master Prado NP

## 2021-09-30 LAB
SARS-COV-2, NAA 2 DAY TAT: NORMAL
SARS-COV-2, NAA: NOT DETECTED

## 2021-11-14 ENCOUNTER — PATIENT MESSAGE (OUTPATIENT)
Dept: FAMILY MEDICINE CLINIC | Age: 56
End: 2021-11-14

## 2021-11-15 NOTE — TELEPHONE ENCOUNTER
Kaylin Rincon 11/15/2021 8:34 AM EST      ----- Message -----  From: No Tony  Sent: 11/15/2021 8:29 AM EST  To: [facility]  Subject: Flu shot     Than you. I can't do Thursday as I am out of town.  Is there anything available Friday afternoon after 1pm?

## 2021-11-19 ENCOUNTER — OFFICE VISIT (OUTPATIENT)
Dept: FAMILY MEDICINE CLINIC | Age: 56
End: 2021-11-19
Payer: COMMERCIAL

## 2021-11-19 DIAGNOSIS — Z23 NEEDS FLU SHOT: Primary | ICD-10-CM

## 2021-11-19 PROCEDURE — 90686 IIV4 VACC NO PRSV 0.5 ML IM: CPT | Performed by: NURSE PRACTITIONER

## 2021-11-19 PROCEDURE — 90471 IMMUNIZATION ADMIN: CPT | Performed by: NURSE PRACTITIONER

## 2021-11-19 NOTE — PROGRESS NOTES
After obtaining consent, and per orders of Jeanmarie NP, injection of Flulaval to (R) deltoid given by Jb Cullen. Patient instructed to remain in clinic for 20 minutes afterwards, and to report any adverse reaction to me immediately.

## 2021-11-19 NOTE — PATIENT INSTRUCTIONS
Vaccine Information Statement    Influenza (Flu) Vaccine (Inactivated or Recombinant): What You Need to Know    Many vaccine information statements are available in Latvian and other languages. See www.immunize.org/vis. Hojas de información sobre vacunas están disponibles en español y en muchos otros idiomas. Visite www.immunize.org/vis. 1. Why get vaccinated? Influenza vaccine can prevent influenza (flu). Flu is a contagious disease that spreads around the [facility] every year, usually between October and May. Anyone can get the flu, but it is more dangerous for some people. Infants and young children, people 72 years and older, pregnant people, and people with certain health conditions or a weakened immune system are at greatest risk of flu complications. Pneumonia, bronchitis, sinus infections, and ear infections are examples of flu-related complications. If you have a medical condition, such as heart disease, cancer, or diabetes, flu can make it worse. Flu can cause fever and chills, sore throat, muscle aches, fatigue, cough, headache, and runny or stuffy nose. Some people may have vomiting and diarrhea, though this is more common in children than adults. In an average year, thousands of people in the [facility] die from flu, and many more are hospitalized. Flu vaccine prevents millions of illnesses and flu-related visits to the doctor each year. 2. Influenza vaccines     CDC recommends everyone 6 months and older get vaccinated every flu season. Children 6 months through 6years of age may need 2 doses during a single flu season. Everyone else needs only 1 dose each flu season. It takes about 2 weeks for protection to develop after vaccination. There are many flu viruses, and they are always changing. Each year a new flu vaccine is made to protect against the influenza viruses believed to be likely to cause disease in the upcoming flu season.  Even when the vaccine doesnt exactly match these viruses, it may still provide some protection. Influenza vaccine does not cause flu. Influenza vaccine may be given at the same time as other vaccines. 3. Talk with your health care provider    Tell your vaccination provider if the person getting the vaccine:   Has had an allergic reaction after a previous dose of influenza vaccine, or has any severe, life-threatening allergies    Has ever had Guillain-Barré Syndrome (also called GBS)    In some cases, your health care provider may decide to postpone influenza vaccination until a future visit. Influenza vaccine can be administered at any time during pregnancy. People who are or will be pregnant during influenza season should receive inactivated influenza vaccine. People with minor illnesses, such as a cold, may be vaccinated. People who are moderately or severely ill should usually wait until they recover before getting influenza vaccine. Your health care provider can give you more information. 4. Risks of a vaccine reaction     Soreness, redness, and swelling where the shot is given, fever, muscle aches, and headache can happen after influenza vaccination.  There may be a very small increased risk of Guillain-Barré Syndrome (GBS) after inactivated influenza vaccine (the flu shot). [facility] children who get the flu shot along with pneumococcal vaccine (PCV13) and/or DTaP vaccine at the same time might be slightly more likely to have a seizure caused by fever. Tell your health care provider if a child who is getting flu vaccine has ever had a seizure. People sometimes faint after medical procedures, including vaccination. Tell your provider if you feel dizzy or have vision changes or ringing in the ears. As with any medicine, there is a very remote chance of a vaccine causing a severe allergic reaction, other serious injury, or death. 5. What if there is a serious problem?     An allergic reaction could occur after the vaccinated person leaves the clinic. If you see signs of a severe allergic reaction (hives, swelling of the face and throat, difficulty breathing, a fast heartbeat, dizziness, or weakness), call 9-1-1 and get the person to the nearest hospital.    For other signs that concern you, call your health care provider. Adverse reactions should be reported to the Vaccine Adverse Event Reporting System (VAERS). Your health care provider will usually file this report, or you can do it yourself. Visit the VAERS website at www.vaers. [facility].gov or call 0-656.181.2267. VAERS is only for reporting reactions, and VAERS staff members do not give medical advice. 6. The National Vaccine Injury Compensation Program    The [facility] Vaccine Injury Compensation Program (VICP) is a federal program that was created to compensate people who may have been injured by certain vaccines. Claims regarding alleged injury or death due to vaccination have a time limit for filing, which may be as short as two years. Visit the VICP website at www.[facility].gov/vaccinecompensation or call 7-894.238.4330 to learn about the program and about filing a claim. 7. How can I learn more?  Ask your health care provider.  Call your local or state health department.  Visit the website of the Food and Drug Administration (FDA) for vaccine package inserts and additional information at www.fda.gov/vaccines-blood-biologics/vaccines.  Contact the Centers for Disease Control and Prevention (CDC):  - Call 3-916.907.5095 (1-800-CDC-INFO) or  - Visit CDCs influenza website at www.cdc.gov/flu. Vaccine Information Statement   Inactivated Influenza Vaccine   8/6/2021  42 MELA [address]ton [id number]   Department of Health and Human Services  Centers for Disease Control and Prevention    Office Use Only

## 2022-02-28 DIAGNOSIS — I10 ESSENTIAL HYPERTENSION: ICD-10-CM

## 2022-02-28 RX ORDER — METOPROLOL SUCCINATE 50 MG/1
50 TABLET, EXTENDED RELEASE ORAL DAILY
Qty: 90 TABLET | Refills: 1 | Status: SHIPPED | OUTPATIENT
Start: 2022-02-28 | End: 2022-04-27 | Stop reason: SDUPTHER

## 2022-03-04 DIAGNOSIS — F51.01 PRIMARY INSOMNIA: ICD-10-CM

## 2022-03-04 RX ORDER — TRAZODONE HYDROCHLORIDE 100 MG/1
TABLET ORAL
Qty: 90 TABLET | Refills: 3 | Status: SHIPPED | OUTPATIENT
Start: 2022-03-04

## 2022-03-18 PROBLEM — I10 ESSENTIAL HYPERTENSION: Status: ACTIVE | Noted: 2018-05-22

## 2022-03-18 PROBLEM — F51.01 PRIMARY INSOMNIA: Status: ACTIVE | Noted: 2018-05-22

## 2022-04-26 DIAGNOSIS — I10 ESSENTIAL HYPERTENSION: ICD-10-CM

## 2022-04-27 RX ORDER — METOPROLOL SUCCINATE 50 MG/1
TABLET, EXTENDED RELEASE ORAL
Qty: 90 TABLET | Refills: 3 | Status: SHIPPED | OUTPATIENT
Start: 2022-04-27

## 2022-04-29 ENCOUNTER — OFFICE VISIT (OUTPATIENT)
Dept: FAMILY MEDICINE CLINIC | Age: 57
End: 2022-04-29
Payer: COMMERCIAL

## 2022-04-29 VITALS — OXYGEN SATURATION: 95 % | HEART RATE: 81 BPM | TEMPERATURE: 98.2 F | RESPIRATION RATE: 19 BRPM

## 2022-04-29 DIAGNOSIS — J02.9 SORE THROAT: ICD-10-CM

## 2022-04-29 DIAGNOSIS — R05.9 COUGH: ICD-10-CM

## 2022-04-29 DIAGNOSIS — R09.81 CONGESTION OF NASAL SINUS: Primary | ICD-10-CM

## 2022-04-29 DIAGNOSIS — J40 BRONCHITIS: ICD-10-CM

## 2022-04-29 DIAGNOSIS — R50.9 FEVER, UNSPECIFIED FEVER CAUSE: ICD-10-CM

## 2022-04-29 LAB — SARS-COV-2 POC: NEGATIVE

## 2022-04-29 PROCEDURE — 99213 OFFICE O/P EST LOW 20 MIN: CPT | Performed by: NURSE PRACTITIONER

## 2022-04-29 PROCEDURE — 87426 SARSCOV CORONAVIRUS AG IA: CPT | Performed by: NURSE PRACTITIONER

## 2022-04-29 RX ORDER — PREDNISONE 20 MG/1
20 TABLET ORAL
Qty: 5 TABLET | Refills: 0 | Status: SHIPPED | OUTPATIENT
Start: 2022-04-29

## 2022-04-29 RX ORDER — AMOXICILLIN 500 MG/1
500 CAPSULE ORAL 2 TIMES DAILY
Qty: 20 CAPSULE | Refills: 0 | Status: SHIPPED | OUTPATIENT
Start: 2022-04-29 | End: 2022-05-09

## 2022-04-29 RX ORDER — CODEINE PHOSPHATE AND GUAIFENESIN 10; 100 MG/5ML; MG/5ML
5 SOLUTION ORAL
Qty: 140 ML | Refills: 0 | Status: SHIPPED | OUTPATIENT
Start: 2022-04-29 | End: 2022-05-06

## 2022-04-29 NOTE — PROGRESS NOTES
Rosalba Boateng is a 61 y.o. female presenting for/with:    Chief Complaint   Patient presents with    Nasal Congestion     pt presents with stuffy head, cough,sore throat, fevers between 99.5-99.8, fatigue. Took 2 rapid covid tests wednesday and thursday and both were negative. Sx since tuesday. Visit Vitals  Pulse 81   Temp 98.2 °F (36.8 °C) (Oral)   Resp 19   LMP 12/31/2015   SpO2 95%     Pain Scale: 0 - No pain/10  Pain Location:       3 most recent PHQ Screens 4/29/2022   Little interest or pleasure in doing things Not at all   Feeling down, depressed, irritable, or hopeless Not at all   Total Score PHQ 2 0     Learning Assessment 4/29/2022   PRIMARY LEARNER Patient   BARRIERS PRIMARY LEARNER -   CO-LEARNER CAREGIVER -   PRIMARY LANGUAGE ENGLISH   LEARNER PREFERENCE PRIMARY READING   LEARNING SPECIAL TOPICS -   ANSWERED BY pt   RELATIONSHIP SELF   ASSESSMENT COMMENT -     Fall Risk Assessment, last 12 mths 4/29/2022   Able to walk? Yes   Fall in past 12 months? 0   Do you feel unsteady? 0   Are you worried about falling 0   Is TUG test greater than 12 seconds? -   Is the gait abnormal? -   Number of falls in past 12 months -   Fall with injury? -     Abuse Screening Questionnaire 4/29/2022   Do you ever feel afraid of your partner? N   Are you in a relationship with someone who physically or mentally threatens you? N   Is it safe for you to go home?  Y     ADL Assessment 4/29/2022   Feeding yourself No Help Needed   Getting from bed to chair No Help Needed   Getting dressed No Help Needed   Bathing or showering No Help Needed   Walk across the room (includes cane/walker) No Help Needed   Using the telphone No Help Needed   Taking your medications No Help Needed   Preparing meals No Help Needed   Managing money (expenses/bills) No Help Needed   Moderately strenuous housework (laundry) No Help Needed   Shopping for personal items (toiletries/medicines) No Help Needed   Shopping for groceries No Help Needed Driving No Help Needed   Climbing a flight of stairs No Help Needed   Getting to places beyond walking distances No Help Needed       1. \"Have you been to the ER, urgent care clinic since your last visit? Hospitalized since your last visit? \" No    2. \"Have you seen or consulted any other health care providers outside of the [address] since your last visit? \" No     3. For patients aged 39-70: Has the patient had a colonoscopy / FIT/ Cologuard? No      If the patient is female:    4. For patients aged 41-77: Has the patient had a mammogram within the past 2 years? No      5. For patients aged 21-65: Has the patient had a pap smear? No          Symptom review:    YES  Fever   NO  Shaking chills  YES  Cough  YES  Body aches  NO  Coughing up blood  YES  Chest congestion  NO  Chest pain  NO  Shortness of breath  NO  Profound Loss of smell/taste  NO  Nausea/Vomiting   NO  Loose stool/Diarrhea  NO  any skin issues    Patient Risk Factors Reviewed as follows:  NO  have you been in Close contact with confirmed COVID19 patient   NO  History of recent travel to affected geographical areas within the past 14 days  NO  COPD  NO  Active Cancer/Leukemia/Lymphoma/Chemotherapy  NO  Oral steroid use  NO  Pregnant  NO  Diabetes Mellitus  NO  Heart disease  NO  Asthma  NO Health care worker at home  NO Health care worker  NO Is there a Pregnant Woman in the home  NO Dialysis pt in the home   NO a large number of people living in the home  Recent Travel Screening and Travel History documentation     Travel Screening     Question   Response    In the last 10 days, have you been in contact with someone who was confirmed or suspected to have Coronavirus/COVID-19? Yes    Have you had a COVID-19 viral test in the last 10 days? Yes - Pending result    Do you have any of the following new or worsening symptoms? Fever; Fatigue;Runny nose; Sore throat;Cough; Severe headache;Weakness    Have you traveled internationally or domestically in the last month?   No      Travel History   Travel since 03/29/22    No documented travel since 03/29/22               Advance Care Planning 4/29/2022   Patient's Healthcare Decision Maker is: Legal Next of Kin   Confirm Advance Directive None   Patient Would Like to Complete Advance Directive No      Results for orders placed or performed in visit on 04/29/22   AMB POC SARS-COV-2   Result Value Ref Range    SARS-COV-2 POC Negative Negative

## 2022-04-29 NOTE — PROGRESS NOTES
Chief Complaint   Patient presents with    Nasal Congestion     pt presents with stuffy head, cough,sore throat, fevers between 99.5-99.8, fatigue. Took 2 rapid covid tests wednesday and thursday and both were negative. Sx since tuesday. HPI:       is a 61 y.o. female. New Issues:  She has been sick since Tuesday. Seems to be getting worse. Symptoms include nasal congestion, yellow nasal discharge, low grade fever, severe cough and fatigue. Rapid COVID at home was negative x 2. Allergies   Allergen Reactions    Azithromycin Hives    Doxylamine Succinate Hives       Current Outpatient Medications   Medication Sig    metoprolol succinate (TOPROL-XL) 50 mg XL tablet TAKE 1 TABLET DAILY    traZODone (DESYREL) 100 mg tablet TAKE 1 TABLET NIGHTLY    diphenhydrAMINE (BenadryL) 25 mg capsule Take 25 mg by mouth every six (6) hours as needed.  acetaminophen (TylenoL) 325 mg tablet Take  by mouth every four (4) hours as needed for Pain.  MILK THISTLE, BULK, by Does Not Apply route.  multivitamin (ONE A DAY) tablet Take 1 Tab by mouth daily.  ibuprofen (ADVIL) 200 mg tablet Take  by mouth every six (6) hours as needed for Pain.  loratadine (CLARITIN) 10 mg tablet Take 10 mg by mouth. No current facility-administered medications for this visit. Past Medical History:   Diagnosis Date    Endometrial hyperplasia     S/P D&C .       Essential hypertension     Hiatal hernia     Menopause     Migraine     Osteoarthritis     Ruptured appendix     Age 31    Skin cancer     Sun-damaged skin     Sunburn, blistering     Tanning bed exposure        Past Surgical History:   Procedure Laterality Date    HX APPENDECTOMY      HX GYN         Social History     Socioeconomic History    Marital status:    Tobacco Use    Smoking status: Former Smoker     Quit date: 3/1/1996     Years since quittin.1    Smokeless tobacco: Never Used   Substance and Sexual Activity    Alcohol use: Yes     Alcohol/week: 1.0 standard drink     Types: 1 Glasses of wine per week     Comment: daily    Drug use: No    Sexual activity: Yes       Family History   Problem Relation Age of Onset    Diabetes Father        Above history reviewed. ROS:  POS fever, denies chills, POS cough, denies chest pain, SOB,  nausea, vomiting, or diarrhea. Denies wt loss, wt gain, hemoptysis, hematochezia or melena. Physical Examination:    Pulse 81   Temp 98.2 °F (36.8 °C) (Oral)   Resp 19   LMP 12/31/2015   SpO2 95%     General: Alert and Ox3, Fluent speech  HEENT:  PERRLA, EOM intact, TMs, turbinates, pharynx normal.  No thyromegaly. No cervical adenopathy. Neck:  Supple, no adenopathy, JVD, mass or bruit  Chest:  Expiratory wheeze bilaterally with rhonchi in right lower lobe. Cardiac: RRR  Extremities:  No cyanosis, clubbing or edema  Neurologic:  Ambulatory without assist, CN 2-12 grossly intact. Moves all extremities. Skin: no rash  Lymphadenopathy: no cervical or supraclavicular nodes    Results for orders placed or performed in visit on 04/29/22   AMB POC SARS-COV-2   Result Value Ref Range    SARS-COV-2 POC Negative Negative      ASSESSMENT AND PLAN:     1. Congestion of nasal sinus  Reviewed self care measures:  Fluids  Nasal Saline  Humidification + menthol petroleum (Vicks.)  Postural drainage  NSAID of choice PRN  Avoid decongestants, too drying and difficult to clear respiratory secretions. - AMB POC SARS-COV-2  - predniSONE (DELTASONE) 20 mg tablet; Take 20 mg by mouth daily (with breakfast). Dispense: 5 Tablet; Refill: 0  - amoxicillin (AMOXIL) 500 mg capsule; Take 1 Capsule by mouth two (2) times a day for 10 days. Dispense: 20 Capsule; Refill: 0    2. Sore throat  - AMB POC SARS-COV-2    3. Fever, unspecified fever cause  - AMB POC SARS-COV-2    4. Bronchitis  - predniSONE (DELTASONE) 20 mg tablet; Take 20 mg by mouth daily (with breakfast).   Dispense: 5 Tablet; Refill: 0  - amoxicillin (AMOXIL) 500 mg capsule; Take 1 Capsule by mouth two (2) times a day for 10 days. Dispense: 20 Capsule; Refill: 0    5. Cough  Do not drive after taking   - guaiFENesin-codeine (ROBITUSSIN AC) 100-10 mg/5 mL solution; Take 5 mL by mouth four (4) times daily as needed for Cough for up to 7 days. Max Daily Amount: 20 mL.   Dispense: 140 mL; Refill: 0     RTC PRN     Vinicius Bermudez NP

## 2022-10-18 ENCOUNTER — CLINICAL SUPPORT (OUTPATIENT)
Dept: FAMILY MEDICINE CLINIC | Age: 57
End: 2022-10-18
Payer: COMMERCIAL

## 2022-10-18 DIAGNOSIS — Z23 NEEDS FLU SHOT: Primary | ICD-10-CM

## 2022-10-18 PROCEDURE — 90471 IMMUNIZATION ADMIN: CPT | Performed by: NURSE PRACTITIONER

## 2022-10-18 PROCEDURE — 90686 IIV4 VACC NO PRSV 0.5 ML IM: CPT | Performed by: NURSE PRACTITIONER

## 2022-10-18 NOTE — PATIENT INSTRUCTIONS
Vaccine Information Statement    Influenza (Flu) Vaccine (Inactivated or Recombinant): What You Need to Know    Many vaccine information statements are available in [place] and other languages. See www.immunize.org/vis. Hojas de información sobre vacunas están disponibles en español y en muchos otros idiomas. Visite www.immunize.org/vis. 1. Why get vaccinated? Influenza vaccine can prevent influenza (flu). Flu is a contagious disease that spreads around the [facility] every year, usually between October and May. Anyone can get the flu, but it is more dangerous for some people. Infants and young children, people 72 years and older, pregnant people, and people with certain health conditions or a weakened immune system are at greatest risk of flu complications. Pneumonia, bronchitis, sinus infections, and ear infections are examples of flu-related complications. If you have a medical condition, such as heart disease, cancer, or diabetes, flu can make it worse. Flu can cause fever and chills, sore throat, muscle aches, fatigue, cough, headache, and runny or stuffy nose. Some people may have vomiting and diarrhea, though this is more common in children than adults. In an average year, thousands of people in the [facility] die from flu, and many more are hospitalized. Flu vaccine prevents millions of illnesses and flu-related visits to the doctor each year. 2. Influenza vaccines     CDC recommends everyone 6 months and older get vaccinated every flu season. Children 6 months through 6years of age may need 2 doses during a single flu season. Everyone else needs only 1 dose each flu season. It takes about 2 weeks for protection to develop after vaccination. There are many flu viruses, and they are always changing. Each year a new flu vaccine is made to protect against the influenza viruses believed to be likely to cause disease in the upcoming flu season.  Even when the vaccine doesnt exactly match these viruses, it may still provide some protection. Influenza vaccine does not cause flu. Influenza vaccine may be given at the same time as other vaccines. 3. Talk with your health care provider    Tell your vaccination provider if the person getting the vaccine:  Has had an allergic reaction after a previous dose of influenza vaccine, or has any severe, life-threatening allergies   Has ever had Guillain-Barré Syndrome (also called GBS)    In some cases, your health care provider may decide to postpone influenza vaccination until a future visit. Influenza vaccine can be administered at any time during pregnancy. People who are or will be pregnant during influenza season should receive inactivated influenza vaccine. People with minor illnesses, such as a cold, may be vaccinated. People who are moderately or severely ill should usually wait until they recover before getting influenza vaccine. Your health care provider can give you more information. 4. Risks of a vaccine reaction    Soreness, redness, and swelling where the shot is given, fever, muscle aches, and headache can happen after influenza vaccination. There may be a very small increased risk of Guillain-Barré Syndrome (GBS) after inactivated influenza vaccine (the flu shot). [place] children who get the flu shot along with pneumococcal vaccine (PCV13) and/or DTaP vaccine at the same time might be slightly more likely to have a seizure caused by fever. Tell your health care provider if a child who is getting flu vaccine has ever had a seizure. People sometimes faint after medical procedures, including vaccination. Tell your provider if you feel dizzy or have vision changes or ringing in the ears. As with any medicine, there is a very remote chance of a vaccine causing a severe allergic reaction, other serious injury, or death. 5. What if there is a serious problem?     An allergic reaction could occur after the vaccinated person leaves the clinic. If you see signs of a severe allergic reaction (hives, swelling of the face and throat, difficulty breathing, a fast heartbeat, dizziness, or weakness), call 9-1-1 and get the person to the nearest hospital.    For other signs that concern you, call your health care provider. Adverse reactions should be reported to the Vaccine Adverse Event Reporting System (VAERS). Your health care provider will usually file this report, or you can do it yourself. Visit the VAERS website at www.vaers. [facility].gov or call 1-121.568.4609. VAERS is only for reporting reactions, and VAERS staff members do not give medical advice. 6. The National Vaccine Injury Compensation Program    The [facility] Vaccine Injury Compensation Program (VICP) is a federal program that was created to compensate people who may have been injured by certain vaccines. Claims regarding alleged injury or death due to vaccination have a time limit for filing, which may be as short as two years. Visit the VICP website at www.[facility].gov/vaccinecompensation or call 4-878.368.9841 to learn about the program and about filing a claim. 7. How can I learn more? Ask your health care provider. Call your local or state health department. Visit the website of the Food and Drug Administration (FDA) for vaccine package inserts and additional information at www.fda.gov/vaccines-blood-biologics/vaccines. Contact the Centers for Disease Control and Prevention (CDC): Call 7-797.745.3918 (5-510-QUV-INFO) or  Visit CDCs influenza website at www.cdc.gov/flu. Vaccine Information Statement   Inactivated Influenza Vaccine   8/6/2021  42 MELA Lawson [id number]   Department of Health and Human Services  Centers for Disease Control and Prevention    Office Use Only

## 2022-10-18 NOTE — PROGRESS NOTES
After obtaining consent, and per orders of Jeanmarie Mcclain, injection of Flulaval to (R) deltoid given by Craroll Polo. Patient instructed to remain in clinic for 20 minutes afterwards, and to report any adverse reaction to me immediately.

## 2022-11-07 ENCOUNTER — TRANSCRIBE ORDER (OUTPATIENT)
Dept: SCHEDULING | Age: 57
End: 2022-11-07

## 2022-11-07 DIAGNOSIS — Z12.31 SCREENING MAMMOGRAM FOR HIGH-RISK PATIENT: Primary | ICD-10-CM

## 2022-11-09 LAB — PAP SMEAR, EXTERNAL: NEGATIVE

## 2022-12-01 ENCOUNTER — CLINICAL SUPPORT (OUTPATIENT)
Dept: FAMILY MEDICINE CLINIC | Age: 57
End: 2022-12-01
Payer: COMMERCIAL

## 2022-12-01 DIAGNOSIS — R05.9 COUGH, UNSPECIFIED TYPE: Primary | ICD-10-CM

## 2022-12-01 DIAGNOSIS — Z20.828 EXPOSURE TO RESPIRATORY SYNCYTIAL VIRUS (RSV): ICD-10-CM

## 2022-12-01 LAB
RSV POCT, RSVPOCT: NEGATIVE
VALID INTERNAL CONTROL?: YES

## 2022-12-01 PROCEDURE — 87634 RSV DNA/RNA AMP PROBE: CPT | Performed by: FAMILY MEDICINE

## 2022-12-01 NOTE — PROGRESS NOTES
Results for orders placed or performed in visit on 12/01/22   POC RSV    Result Value Ref Range    VALID INTERNAL CONTROL POC Yes     RSV (POC) Negative Negative     Pt notified. Verb understanding.

## 2022-12-16 ENCOUNTER — HOSPITAL ENCOUNTER (OUTPATIENT)
Dept: GENERAL RADIOLOGY | Age: 57
End: 2022-12-16
Payer: COMMERCIAL

## 2022-12-16 ENCOUNTER — HOSPITAL ENCOUNTER (OUTPATIENT)
Dept: MAMMOGRAPHY | Age: 57
Discharge: HOME | End: 2022-12-16
Payer: COMMERCIAL

## 2022-12-16 ENCOUNTER — TRANSCRIBE ORDER (OUTPATIENT)
Dept: REGISTRATION | Age: 57
End: 2022-12-16

## 2022-12-16 VITALS — WEIGHT: 150 LBS | BODY MASS INDEX: 23.49 KG/M2

## 2022-12-16 DIAGNOSIS — M25.562 LEFT KNEE PAIN: ICD-10-CM

## 2022-12-16 DIAGNOSIS — Z12.31 SCREENING MAMMOGRAM FOR HIGH-RISK PATIENT: ICD-10-CM

## 2022-12-16 DIAGNOSIS — M25.562 LEFT KNEE PAIN: Primary | ICD-10-CM

## 2022-12-16 PROCEDURE — 77063 BREAST TOMOSYNTHESIS BI: CPT

## 2022-12-16 PROCEDURE — 73564 X-RAY EXAM KNEE 4 OR MORE: CPT

## 2023-05-22 RX ORDER — TRAZODONE HYDROCHLORIDE 100 MG/1
100 TABLET ORAL NIGHTLY
Qty: 90 TABLET | Refills: 0 | Status: SHIPPED | OUTPATIENT
Start: 2023-05-22

## 2023-06-13 RX ORDER — TRIAMCINOLONE ACETONIDE 55 UG/1
2 SPRAY, METERED NASAL DAILY
COMMUNITY

## 2023-06-15 ENCOUNTER — ANESTHESIA EVENT (OUTPATIENT)
Facility: HOSPITAL | Age: 58
End: 2023-06-15
Payer: COMMERCIAL

## 2023-06-19 ENCOUNTER — PREP FOR PROCEDURE (OUTPATIENT)
Age: 58
End: 2023-06-19

## 2023-06-19 RX ORDER — SODIUM CHLORIDE 0.9 % (FLUSH) 0.9 %
5-40 SYRINGE (ML) INJECTION EVERY 12 HOURS SCHEDULED
Status: CANCELLED | OUTPATIENT
Start: 2023-06-19

## 2023-06-19 RX ORDER — SODIUM CHLORIDE 9 MG/ML
INJECTION, SOLUTION INTRAVENOUS PRN
Status: CANCELLED | OUTPATIENT
Start: 2023-06-19

## 2023-06-19 RX ORDER — SODIUM CHLORIDE, SODIUM LACTATE, POTASSIUM CHLORIDE, CALCIUM CHLORIDE 600; 310; 30; 20 MG/100ML; MG/100ML; MG/100ML; MG/100ML
INJECTION, SOLUTION INTRAVENOUS CONTINUOUS
Status: CANCELLED | OUTPATIENT
Start: 2023-06-19

## 2023-06-19 RX ORDER — SODIUM CHLORIDE 0.9 % (FLUSH) 0.9 %
5-40 SYRINGE (ML) INJECTION PRN
Status: CANCELLED | OUTPATIENT
Start: 2023-06-19

## 2023-06-19 NOTE — H&P
HPI    78-year-old female who presents as a self-referral but is a patient of Tremaine Guardado for possible screening colonoscopy. She believes her last colonoscopy was in  at which point they found diverticulosis but no polyps. She has no first-degree relative with history of colon cancer. She denies any melena or hematochezia or diarrhea or constipation. She denies any abdominal pain or unexpected weight loss. She has had no change in the caliber of her stool and has had no recent stool testing. She has a history of hypertension and osteoarthritis. She has a history of a ruptured appendix managed by laparoscopic appendectomy as well as a history of a tonsillectomy and D&C. She is not on aspirin or any other blood thinners. She has been vaccinated for COVID. She is a former smoker. Past Medical History:   Diagnosis Date    Endometrial hyperplasia     S/P D&C . Essential hypertension     Hiatal hernia     Menopause     Migraine     Osteoarthritis     Ruptured appendix     Age 31    Skin cancer     Sun-damaged skin     Sunburn, blistering     Tanning bed exposure        Past Surgical History:   Procedure Laterality Date    HX APPENDECTOMY      HX GYN         Social History     Socioeconomic History    Marital status:      Spouse name: Not on file    Number of children: Not on file    Years of education: Not on file    Highest education level: Not on file   Occupational History    Not on file   Tobacco Use    Smoking status: Former     Types: Cigarettes     Quit date: 3/1/1996     Years since quittin.1    Smokeless tobacco: Never   Substance and Sexual Activity    Alcohol use:  Yes     Alcohol/week: 1.0 standard drink     Types: 1 Glasses of wine per week     Comment: daily    Drug use: No    Sexual activity: Yes   Other Topics Concern    Not on file   Social History Narrative    Not on file     Social Determinants of Health     Financial Resource Strain: Not on file   Food

## 2023-06-19 NOTE — H&P (VIEW-ONLY)
Insecurity: Not on file   Transportation Needs: Not on file   Physical Activity: Not on file   Stress: Not on file   Social Connections: Not on file   Intimate Partner Violence: Not on file   Housing Stability: Not on file       Family History   Problem Relation Age of Onset    Diabetes Father        Allergies   Allergen Reactions    Azithromycin Hives    Doxylamine Succinate Hives       Current Outpatient Medications   Medication Sig    traZODone (DESYREL) 100 mg tablet TAKE 1 TABLET NIGHTLY    metoprolol succinate (TOPROL-XL) 50 mg XL tablet TAKE 1 TABLET DAILY    diphenhydrAMINE (BENADRYL) 25 mg capsule Take 1 Capsule by mouth every six (6) hours as needed. acetaminophen (TYLENOL) 325 mg tablet Take  by mouth every four (4) hours as needed for Pain. MILK THISTLE, BULK, by Does Not Apply route. ibuprofen (MOTRIN) 200 mg tablet Take  by mouth every six (6) hours as needed for Pain.    loratadine (CLARITIN) 10 mg tablet Take 1 Tablet by mouth.    predniSONE (DELTASONE) 20 mg tablet Take 20 mg by mouth daily (with breakfast). multivitamin (ONE A DAY) tablet Take 1 Tab by mouth daily. No current facility-administered medications for this visit. The above histories, medications and allergies have been reviewed. ROS    Visit Vitals  /84 (BP 1 Location: Left upper arm, BP Patient Position: Sitting, BP Cuff Size: Adult)   Pulse 72   Temp 97.3 °F (36.3 °C) (Temporal)   Resp 16   Ht 5' 7\" (1.702 m)   Wt 157 lb (71.2 kg)   LMP 12/31/2015   SpO2 96%   BMI 24.59 kg/m²     Physical Exam  Constitutional:       Appearance: Normal appearance. Cardiovascular:      Rate and Rhythm: Normal rate and regular rhythm. Pulmonary:      Effort: No respiratory distress. Breath sounds: Normal breath sounds. No stridor. Abdominal:      General: There is no distension. Palpations: Abdomen is soft. There is no mass. Tenderness: There is no abdominal tenderness.    Neurological:      Mental

## 2023-06-20 ENCOUNTER — ANESTHESIA (OUTPATIENT)
Facility: HOSPITAL | Age: 58
End: 2023-06-20
Payer: COMMERCIAL

## 2023-06-20 ENCOUNTER — HOSPITAL ENCOUNTER (OUTPATIENT)
Facility: HOSPITAL | Age: 58
Setting detail: OUTPATIENT SURGERY
Discharge: HOME | End: 2023-06-20
Attending: SURGERY | Admitting: SURGERY
Payer: COMMERCIAL

## 2023-06-20 VITALS
BODY MASS INDEX: 22.76 KG/M2 | RESPIRATION RATE: 16 BRPM | OXYGEN SATURATION: 96 % | HEART RATE: 61 BPM | WEIGHT: 145 LBS | SYSTOLIC BLOOD PRESSURE: 130 MMHG | TEMPERATURE: 97.7 F | HEIGHT: 67 IN | DIASTOLIC BLOOD PRESSURE: 92 MMHG

## 2023-06-20 PROBLEM — Z12.11 COLON CANCER SCREENING: Status: ACTIVE | Noted: 2023-06-20

## 2023-06-20 PROCEDURE — 2709999900 HC NON-CHARGEABLE SUPPLY: Performed by: SURGERY

## 2023-06-20 PROCEDURE — 3600000012 HC SURGERY LEVEL 2 ADDTL 15MIN: Performed by: SURGERY

## 2023-06-20 PROCEDURE — 6360000002 HC RX W HCPCS: Performed by: ANESTHESIOLOGY

## 2023-06-20 PROCEDURE — 3600000002 HC SURGERY LEVEL 2 BASE: Performed by: SURGERY

## 2023-06-20 PROCEDURE — 45385 COLONOSCOPY W/LESION REMOVAL: CPT | Performed by: SURGERY

## 2023-06-20 PROCEDURE — 2580000003 HC RX 258: Performed by: SURGERY

## 2023-06-20 PROCEDURE — 88305 TISSUE EXAM BY PATHOLOGIST: CPT

## 2023-06-20 PROCEDURE — 7100000001 HC PACU RECOVERY - ADDTL 15 MIN: Performed by: SURGERY

## 2023-06-20 PROCEDURE — 3700000000 HC ANESTHESIA ATTENDED CARE: Performed by: SURGERY

## 2023-06-20 PROCEDURE — 7100000000 HC PACU RECOVERY - FIRST 15 MIN: Performed by: SURGERY

## 2023-06-20 PROCEDURE — 3700000001 HC ADD 15 MINUTES (ANESTHESIA): Performed by: SURGERY

## 2023-06-20 RX ORDER — FENTANYL CITRATE 50 UG/ML
INJECTION, SOLUTION INTRAMUSCULAR; INTRAVENOUS PRN
Status: DISCONTINUED | OUTPATIENT
Start: 2023-06-20 | End: 2023-06-20 | Stop reason: SDUPTHER

## 2023-06-20 RX ORDER — SODIUM CHLORIDE 0.9 % (FLUSH) 0.9 %
5-40 SYRINGE (ML) INJECTION EVERY 12 HOURS SCHEDULED
Status: DISCONTINUED | OUTPATIENT
Start: 2023-06-20 | End: 2023-06-20 | Stop reason: HOSPADM

## 2023-06-20 RX ORDER — SODIUM CHLORIDE 0.9 % (FLUSH) 0.9 %
5-40 SYRINGE (ML) INJECTION PRN
Status: DISCONTINUED | OUTPATIENT
Start: 2023-06-20 | End: 2023-06-20 | Stop reason: HOSPADM

## 2023-06-20 RX ORDER — SODIUM CHLORIDE, SODIUM LACTATE, POTASSIUM CHLORIDE, CALCIUM CHLORIDE 600; 310; 30; 20 MG/100ML; MG/100ML; MG/100ML; MG/100ML
INJECTION, SOLUTION INTRAVENOUS CONTINUOUS
Status: DISCONTINUED | OUTPATIENT
Start: 2023-06-20 | End: 2023-06-20 | Stop reason: HOSPADM

## 2023-06-20 RX ORDER — PROPOFOL 10 MG/ML
INJECTION, EMULSION INTRAVENOUS CONTINUOUS PRN
Status: DISCONTINUED | OUTPATIENT
Start: 2023-06-20 | End: 2023-06-20 | Stop reason: SDUPTHER

## 2023-06-20 RX ORDER — SODIUM CHLORIDE 9 MG/ML
INJECTION, SOLUTION INTRAVENOUS PRN
Status: DISCONTINUED | OUTPATIENT
Start: 2023-06-20 | End: 2023-06-20 | Stop reason: HOSPADM

## 2023-06-20 RX ORDER — PROPOFOL 10 MG/ML
INJECTION, EMULSION INTRAVENOUS PRN
Status: DISCONTINUED | OUTPATIENT
Start: 2023-06-20 | End: 2023-06-20 | Stop reason: SDUPTHER

## 2023-06-20 RX ADMIN — FENTANYL CITRATE 50 MCG: 50 INJECTION, SOLUTION INTRAMUSCULAR; INTRAVENOUS at 09:59

## 2023-06-20 RX ADMIN — PROPOFOL 150 MCG/KG/MIN: 10 INJECTION, EMULSION INTRAVENOUS at 09:41

## 2023-06-20 RX ADMIN — PROPOFOL 100 MG: 10 INJECTION, EMULSION INTRAVENOUS at 09:41

## 2023-06-20 RX ADMIN — SODIUM CHLORIDE, POTASSIUM CHLORIDE, SODIUM LACTATE AND CALCIUM CHLORIDE: 600; 310; 30; 20 INJECTION, SOLUTION INTRAVENOUS at 09:15

## 2023-06-20 RX ADMIN — FENTANYL CITRATE 50 MCG: 50 INJECTION, SOLUTION INTRAMUSCULAR; INTRAVENOUS at 09:47

## 2023-06-20 ASSESSMENT — PAIN SCALES - GENERAL
PAINLEVEL_OUTOF10: 0

## 2023-06-20 ASSESSMENT — PAIN - FUNCTIONAL ASSESSMENT: PAIN_FUNCTIONAL_ASSESSMENT: 0-10

## 2023-06-20 NOTE — OP NOTE
[facility]  OPERATIVE REPORT    Name:  Esteban Oneil  MR#:  [id number]  :  1965  ACCOUNT #:  [de-identified]  DATE OF SERVICE:  2023    PREOPERATIVE DIAGNOSIS:  Screening colonoscopy. POSTOPERATIVE DIAGNOSIS:  Screening colonoscopy. PROCEDURE PERFORMED:  Colonoscopy with hot snare polypectomy. SURGEON:  Santos Santos MD    ASSISTANT:  None    ANESTHESIA:  MAC.    COMPLICATIONS:  None. SPECIMENS REMOVED:  Proximal ascending colon polyp. IMPLANTS:  None    DRAINS:  None. ESTIMATED BLOOD LOSS:  Minimal.    FINDINGS:  1. Diverticulosis. 2.  Proximal ascending colon polyp. DISPOSITION:  Stable. PROCEDURE:  The patient was brought to the operative theater. Monitoring devices and nasal cannula O2 were placed per Anesthesia, and the patient was placed in the left side down decubitus position. IV sedation was administered and a time-out was performed. Digital rectal exam was performed which was essentially normal.  A well-lubricated Olympus pediatric colonoscope was introduced in the patient's rectum and advanced to the cecum. We did need to apply abdominal pressure. The cecum was identified by the ileocecal valve. The prep was adequate to proceed. The scope was carefully withdrawn with mucosal surfaces circumferentially evaluated. The patient had scattered diverticulosis throughout. In the proximal ascending colon, just a few centimeters distal to the ileocecal valve was a small polypoid lesion which was removed in its entirety by hot snare technique. Other than the diverticulosis and the polyp that was removed in proximal ascending colon, no additional abnormalities were noted in the ascending colon, transverse colon, descending colon or sigmoid colon. The scope was pulled back into the rectum and retroflexed which did not reveal any lesions. The scope was straightened out and carefully withdrawn.     The patient tolerated the procedure well and was

## 2023-06-20 NOTE — ANESTHESIA PRE PROCEDURE
Department of Anesthesiology  Preprocedure Note       Name:  Swetha Aguilera   Age:  60 y.o.  :  1965                                          MRN:  [id number]         Date:  2023      Surgeon: Janak Ellison):  Rosa Morerll MD    Procedure: Procedure(s):  COLONOSCOPY    Medications prior to admission:   Prior to Admission medications    Medication Sig Start Date End Date Taking?  Authorizing Provider   triamcinolone (NASACORT) 55 MCG/ACT nasal inhaler 2 sprays by Each Nostril route daily   Yes Historical Provider, MD   Milk Thistle 250 MG CAPS Take by mouth   Yes Historical Provider, MD   traZODone (DESYREL) 100 MG tablet Take 1 tablet by mouth nightly Needs visit for more fills 23   GEOFF Whitfield - NP   acetaminophen (TYLENOL) 325 MG tablet Take by mouth every 4 hours as needed    Ar Automatic Reconciliation   diphenhydrAMINE (BENADRYL) 25 MG capsule Take 1 capsule by mouth every 6 hours as needed  Patient not taking: Reported on 2023    Ar Automatic Reconciliation   ibuprofen (ADVIL;MOTRIN) 200 MG tablet Take by mouth every 6 hours as needed    Ar Automatic Reconciliation   loratadine (CLARITIN) 10 MG tablet Take 1 tablet by mouth    Ar Automatic Reconciliation   metoprolol succinate (TOPROL XL) 50 MG extended release tablet Take 1 tablet by mouth daily 22   Ar Automatic Reconciliation   predniSONE (DELTASONE) 20 MG tablet Take 20 mg by mouth daily (with breakfast)  Patient not taking: Reported on 2023   Ar Automatic Reconciliation       Current medications:    Current Facility-Administered Medications   Medication Dose Route Frequency Provider Last Rate Last Admin    sodium chloride flush 0.9 % injection 5-40 mL  5-40 mL IntraVENous 2 times per day Rosa Morrell MD        sodium chloride flush 0.9 % injection 5-40 mL  5-40 mL IntraVENous PRN Rosa Morrell MD        0.9 % sodium chloride infusion   IntraVENous PRN Rosa Morrell MD        lactated ringers IV

## 2023-06-20 NOTE — BRIEF OP NOTE
Brief Postoperative Note      Patient: Jake Malone  YOB: 1965  MRN: [id number]    Date of Procedure: 6/20/2023    Pre-Op Diagnosis Codes:      * Special screening for malignant neoplasms, colon [Z12.11]    Post-Op Diagnosis: Same       Procedure(s):  COLONOSCOPY WITH HOT SNARE POLYPECTOMY    Surgeon(s):  Saintclair Heinz, MD    Assistant:  * No surgical staff found *    Anesthesia: Monitor Anesthesia Care    Estimated Blood Loss (mL): Minimal    Complications: None    Specimens:   ID Type Source Tests Collected by Time Destination   A : Proximal ascending colon polyp Tissue Colon SURGICAL PATHOLOGY Saintclair Heinz, MD 6/20/2023 1002        Implants:  * No implants in log *      Drains: * No LDAs found *    Findings:   Diverticulosis  Proximal ascending colon polyp        Electronically signed by Caleb Stewart MD on 6/20/2023 at 10:13 AM

## 2023-06-20 NOTE — ANESTHESIA POSTPROCEDURE EVALUATION
Department of Anesthesiology  Postprocedure Note    Patient: Khanh Yip  MRN: [id number]  YOB: 1965  Date of evaluation: 6/20/2023      Procedure Summary     Date: 06/20/23 Room / Location: hospitals MAIN OR 1 / hospitals MAIN OR    Anesthesia Start: 0938 Anesthesia Stop: 6618    Procedure: COLONOSCOPY WITH HOT SNARE POLYPECTOMY (Lower GI Region) Diagnosis:       Special screening for malignant neoplasms, colon      (Special screening for malignant neoplasms, colon [Z12.11])    Surgeons: Mally Urena MD Responsible Provider: Madelyne Leyden, MD    Anesthesia Type: TIVA ASA Status: 2          Anesthesia Type: No value filed.     Gagan Phase I:      Gagan Phase II:        Anesthesia Post Evaluation    Patient location during evaluation: PACU  Patient participation: complete - patient participated  Level of consciousness: awake and alert  Pain score: 0  Nausea & Vomiting: no nausea and no vomiting  Complications: no  Cardiovascular status: blood pressure returned to baseline and hemodynamically stable  Respiratory status: acceptable and room air  Hydration status: euvolemic

## 2023-06-20 NOTE — PROGRESS NOTES
Chief Complaint   Patient presents with    Gynecologic Exam     Completed with Tammie Perez. Letter sent      Hypertension     Routine F/U.          HPI:       is a 60 y.o. female.  to . Is very active and enjoys biking. HTN: Well controlled on Metoprolol. No side effects. Insomnia: Fair control on Trazodone. Usually only takes 1/2 tab. IFG: Last A1c was 5.9%. New Issues:  She recently got her colonoscopy. Went well. Also, had both thumbs injected by Dr. Paul Acosta for OA on 6/22/23. BP was high at her colonoscopy as well. Has not been checking at home. Allergies   Allergen Reactions    Latex      Reports mainly from dental procedure causes respiratory allergies     Azithromycin Hives    Doxylamine Hives       Current Outpatient Medications   Medication Sig Dispense Refill    triamcinolone (NASACORT) 55 MCG/ACT nasal inhaler 2 sprays by Each Nostril route daily      Milk Thistle 250 MG CAPS Take by mouth      traZODone (DESYREL) 100 MG tablet Take 1 tablet by mouth nightly Needs visit for more fills 90 tablet 0    acetaminophen (TYLENOL) 325 MG tablet Take by mouth every 4 hours as needed      diphenhydrAMINE (BENADRYL) 25 MG capsule Take 1 capsule by mouth every 6 hours as needed (Patient not taking: Reported on 6/20/2023)      ibuprofen (ADVIL;MOTRIN) 200 MG tablet Take by mouth every 6 hours as needed      loratadine (CLARITIN) 10 MG tablet Take 1 tablet by mouth      metoprolol succinate (TOPROL XL) 50 MG extended release tablet Take 1 tablet by mouth daily       No current facility-administered medications for this visit. Past Medical History:   Diagnosis Date    Endometrial hyperplasia     S/P D&C 2016.       Essential hypertension     Hiatal hernia     Menopause     Migraine     Osteoarthritis     Ruptured appendix     Age 31    Skin cancer     Sun-damaged skin     Sunburn, blistering     Tanning bed exposure        Past Surgical History:   Procedure Laterality Date

## 2023-06-20 NOTE — INTERVAL H&P NOTE
Update History & Physical    The patient's History and Physical of June 19, 2023 was reviewed with the patient and I examined the patient. There was no change. The surgical site was confirmed by the patient and me. Plan: The risks, benefits, expected outcome, and alternative to the recommended procedure have been discussed with the patient. Patient understands and wants to proceed with the procedure.      Electronically signed by Derek Jerry MD on 6/20/2023 at 9:31 AM

## 2023-06-23 ENCOUNTER — OFFICE VISIT (OUTPATIENT)
Age: 58
End: 2023-06-23
Payer: COMMERCIAL

## 2023-06-23 VITALS
SYSTOLIC BLOOD PRESSURE: 144 MMHG | TEMPERATURE: 98.1 F | DIASTOLIC BLOOD PRESSURE: 100 MMHG | HEIGHT: 67 IN | OXYGEN SATURATION: 96 % | RESPIRATION RATE: 16 BRPM | HEART RATE: 81 BPM | WEIGHT: 152.2 LBS | BODY MASS INDEX: 23.89 KG/M2

## 2023-06-23 DIAGNOSIS — E55.9 VITAMIN D DEFICIENCY: ICD-10-CM

## 2023-06-23 DIAGNOSIS — I10 ESSENTIAL HYPERTENSION: Primary | ICD-10-CM

## 2023-06-23 DIAGNOSIS — R73.01 IMPAIRED FASTING GLUCOSE: ICD-10-CM

## 2023-06-23 PROCEDURE — 99214 OFFICE O/P EST MOD 30 MIN: CPT | Performed by: NURSE PRACTITIONER

## 2023-06-23 PROCEDURE — 3077F SYST BP >= 140 MM HG: CPT | Performed by: NURSE PRACTITIONER

## 2023-06-23 PROCEDURE — 3080F DIAST BP >= 90 MM HG: CPT | Performed by: NURSE PRACTITIONER

## 2023-06-23 RX ORDER — METOPROLOL SUCCINATE 100 MG/1
100 TABLET, EXTENDED RELEASE ORAL DAILY
Qty: 90 TABLET | Refills: 4
Start: 2023-06-23

## 2023-06-23 SDOH — ECONOMIC STABILITY: HOUSING INSECURITY
IN THE LAST 12 MONTHS, WAS THERE A TIME WHEN YOU DID NOT HAVE A STEADY PLACE TO SLEEP OR SLEPT IN A SHELTER (INCLUDING NOW)?: NO

## 2023-06-23 SDOH — ECONOMIC STABILITY: FOOD INSECURITY: WITHIN THE PAST 12 MONTHS, THE FOOD YOU BOUGHT JUST DIDN'T LAST AND YOU DIDN'T HAVE MONEY TO GET MORE.: NEVER TRUE

## 2023-06-23 SDOH — ECONOMIC STABILITY: INCOME INSECURITY: HOW HARD IS IT FOR YOU TO PAY FOR THE VERY BASICS LIKE FOOD, HOUSING, MEDICAL CARE, AND HEATING?: NOT HARD AT ALL

## 2023-06-23 SDOH — ECONOMIC STABILITY: TRANSPORTATION INSECURITY
IN THE PAST 12 MONTHS, HAS LACK OF TRANSPORTATION KEPT YOU FROM MEETINGS, WORK, OR FROM GETTING THINGS NEEDED FOR DAILY LIVING?: NO

## 2023-06-23 SDOH — ECONOMIC STABILITY: FOOD INSECURITY: WITHIN THE PAST 12 MONTHS, YOU WORRIED THAT YOUR FOOD WOULD RUN OUT BEFORE YOU GOT MONEY TO BUY MORE.: NEVER TRUE

## 2023-06-23 ASSESSMENT — PATIENT HEALTH QUESTIONNAIRE - PHQ9
2. FEELING DOWN, DEPRESSED OR HOPELESS: 0
SUM OF ALL RESPONSES TO PHQ QUESTIONS 1-9: 0
SUM OF ALL RESPONSES TO PHQ9 QUESTIONS 1 & 2: 0
1. LITTLE INTEREST OR PLEASURE IN DOING THINGS: 0
SUM OF ALL RESPONSES TO PHQ QUESTIONS 1-9: 0

## 2023-06-23 NOTE — PROGRESS NOTES
Anahi Buchanan is a 60 y.o. female presenting for/with:    Chief Complaint   Patient presents with    Gynecologic Exam     Completed with Trish Gallagher. Letter sent      Hypertension     Routine F/U. Vitals:    06/23/23 1027   BP: (!) 144/100   Site: Left Upper Arm   Position: Sitting   Cuff Size: Medium Adult   Pulse: 81   Resp: 16   Temp: 98.1 °F (36.7 °C)   TempSrc: Oral   SpO2: 96%   Weight: 152 lb 3.2 oz (69 kg)   Height: 5' 7\" (1.702 m)       Pain Scale: 0 - No pain/10  Pain Location:     1. \"Have you been to the ER, urgent care clinic since your last visit? Hospitalized since your last visit? \" No    2. \"Have you seen or consulted any other health care providers outside of the [address] [place] [address] since your last visit? \" No     3. For patients aged 39-70: Has the patient had a colonoscopy / FIT/ Cologuard? Yes - no Care Gap present     If the patient is female:    4. For patients aged 41-77: Has the patient had a mammogram within the past 2 years? Yes - no Care Gap present    5. For patients aged 21-65: Has the patient had a pap smear? Yes - Care Gap present. Rooming MA/LPN to request most recent results      PHQ-9  6/23/2023   Little interest or pleasure in doing things 0   Little interest or pleasure in doing things -   Feeling down, depressed, or hopeless 0   PHQ-2 Score 0   Total Score PHQ 2 -   PHQ-9 Total Score 0       [facility] AMB LEARNING ASSESSMENT 6/23/2023 4/29/2022 9/28/2021   PRIMARY LEARNER Patient Patient Patient   PRIMARY LANGUAGE ENGLISH ENGLISH ENGLISH   LEARNER PREFERENCE PRIMARY DEMONSTRATION READING READING     VIDEOS - -   ANSWERED BY self pt patient   RELATIONSHIP SELF SELF SELF        Amb Fall Risk Assessment and TUG Test 4/29/2022   Fall in past 12 months? 0   Able to walk?  Yes       ADL ASSESSMENT 6/23/2023   Feeding yourself No Help Needed   Getting from bed to chair No Help Needed   Getting dressed No Help Needed   Bathing or showering No Help Needed   Walk across the room (includes

## 2023-06-24 LAB
25(OH)D3 SERPL-MCNC: 40.8 NG/ML (ref 30–100)
ALBUMIN SERPL-MCNC: 4.3 G/DL (ref 3.5–5)
ALBUMIN/GLOB SERPL: 1.4 (ref 1.1–2.2)
ALP SERPL-CCNC: 52 U/L (ref 45–117)
ALT SERPL-CCNC: 26 U/L (ref 12–78)
ANION GAP SERPL CALC-SCNC: 8 MMOL/L (ref 5–15)
AST SERPL-CCNC: 13 U/L (ref 15–37)
BILIRUB SERPL-MCNC: 0.4 MG/DL (ref 0.2–1)
BUN SERPL-MCNC: 16 MG/DL (ref 6–20)
BUN/CREAT SERPL: 25 (ref 12–20)
CALCIUM SERPL-MCNC: 9.9 MG/DL (ref 8.5–10.1)
CHLORIDE SERPL-SCNC: 107 MMOL/L (ref 97–108)
CHOLEST SERPL-MCNC: 269 MG/DL
CO2 SERPL-SCNC: 25 MMOL/L (ref 21–32)
CREAT SERPL-MCNC: 0.64 MG/DL (ref 0.55–1.02)
EST. AVERAGE GLUCOSE BLD GHB EST-MCNC: 120 MG/DL
GLOBULIN SER CALC-MCNC: 3.1 G/DL (ref 2–4)
GLUCOSE SERPL-MCNC: 134 MG/DL (ref 65–100)
HBA1C MFR BLD: 5.8 % (ref 4–5.6)
HDLC SERPL-MCNC: 105 MG/DL
HDLC SERPL: 2.6 (ref 0–5)
LDLC SERPL CALC-MCNC: 150 MG/DL (ref 0–100)
POTASSIUM SERPL-SCNC: 4 MMOL/L (ref 3.5–5.1)
PROT SERPL-MCNC: 7.4 G/DL (ref 6.4–8.2)
SODIUM SERPL-SCNC: 140 MMOL/L (ref 136–145)
TRIGL SERPL-MCNC: 70 MG/DL
VLDLC SERPL CALC-MCNC: 14 MG/DL

## 2023-06-28 DIAGNOSIS — E78.2 MIXED HYPERLIPIDEMIA: Primary | ICD-10-CM

## 2023-06-28 RX ORDER — ROSUVASTATIN CALCIUM 5 MG/1
5 TABLET, COATED ORAL DAILY
Qty: 30 TABLET | Refills: 1 | Status: SHIPPED | OUTPATIENT
Start: 2023-06-28

## 2023-07-07 ENCOUNTER — TELEPHONE (OUTPATIENT)
Age: 58
End: 2023-07-07

## 2023-07-07 NOTE — TELEPHONE ENCOUNTER
Patient identified by two identifiers (name and last 4 social numbers)    I spoke to patient Eulalio Trujillo)    Patient aware of result and states understanding at this time. Report as follows:    Diverticulosis    Hyperplastic polyp    Repeat Colonoscopy in 10 years, if she has the appropriate stool testing by her PCP    Dr. Chris Bowen gave me permission to give pt her result over the phone.     A copy of Colonoscopy and Pathology report will be sent to patient PCP

## 2023-07-14 RX ORDER — METOPROLOL SUCCINATE 50 MG/1
TABLET, EXTENDED RELEASE ORAL
Qty: 90 TABLET | Refills: 2 | OUTPATIENT
Start: 2023-07-14

## 2023-07-17 ENCOUNTER — PATIENT MESSAGE (OUTPATIENT)
Age: 58
End: 2023-07-17

## 2023-07-17 DIAGNOSIS — E78.2 MIXED HYPERLIPIDEMIA: ICD-10-CM

## 2023-07-17 RX ORDER — ROSUVASTATIN CALCIUM 5 MG/1
5 TABLET, COATED ORAL DAILY
Qty: 90 TABLET | Refills: 4 | Status: SHIPPED | OUTPATIENT
Start: 2023-07-17

## 2023-07-17 NOTE — TELEPHONE ENCOUNTER
From: Gianna Marks  To: Sid Hook  Sent: 7/17/2023 10:30 AM EDT  Subject: Cholesterol med    Hi Janiya  As for the increased blood pressure med, it's going good; BP has been between 110 and 120 for the upper and 65 to 75 for the lower. Average resting heart rate has gone down a bit according to my FitBit, down to upper 50s, used to be low 60s. I do notice that I don't have as much kick when I cycle and my Average speed is down a bit and I feel sluggish often when riding. However, if that is my new norm, I'm OK with it. I won't need to renew the BP med for another 45 days or so as I still have a supply. I'll contact you when it's running low. I've tolerated the new cholesterol med well so far so we should probably go with a 90 day supply by mail from [place]. If you could get that going I would appreciate it. Thank you!   Fahad Porras

## 2023-07-20 PROBLEM — Z12.11 COLON CANCER SCREENING: Status: RESOLVED | Noted: 2023-06-20 | Resolved: 2023-07-20

## 2023-07-23 DIAGNOSIS — E78.2 MIXED HYPERLIPIDEMIA: ICD-10-CM

## 2023-07-24 RX ORDER — ROSUVASTATIN CALCIUM 5 MG/1
TABLET, COATED ORAL
Qty: 30 TABLET | Refills: 1 | Status: SHIPPED | OUTPATIENT
Start: 2023-07-24

## 2023-08-21 RX ORDER — TRAZODONE HYDROCHLORIDE 100 MG/1
TABLET ORAL
Qty: 90 TABLET | Refills: 1 | Status: SHIPPED | OUTPATIENT
Start: 2023-08-21

## 2023-08-28 DIAGNOSIS — I10 ESSENTIAL HYPERTENSION: ICD-10-CM

## 2023-08-28 RX ORDER — METOPROLOL SUCCINATE 100 MG/1
100 TABLET, EXTENDED RELEASE ORAL DAILY
Qty: 90 TABLET | Refills: 4 | Status: SHIPPED | OUTPATIENT
Start: 2023-08-28

## 2023-10-18 ENCOUNTER — NURSE ONLY (OUTPATIENT)
Age: 58
End: 2023-10-18
Payer: COMMERCIAL

## 2023-10-18 DIAGNOSIS — Z23 NEEDS FLU SHOT: Primary | ICD-10-CM

## 2023-10-18 PROCEDURE — 90471 IMMUNIZATION ADMIN: CPT | Performed by: NURSE PRACTITIONER

## 2023-10-18 PROCEDURE — 90674 CCIIV4 VAC NO PRSV 0.5 ML IM: CPT | Performed by: NURSE PRACTITIONER

## 2023-11-24 ENCOUNTER — OFFICE VISIT (OUTPATIENT)
Age: 58
End: 2023-11-24
Payer: COMMERCIAL

## 2023-11-24 VITALS
RESPIRATION RATE: 18 BRPM | OXYGEN SATURATION: 97 % | TEMPERATURE: 98.4 F | HEART RATE: 68 BPM | HEIGHT: 67 IN | BODY MASS INDEX: 25.11 KG/M2 | DIASTOLIC BLOOD PRESSURE: 72 MMHG | WEIGHT: 160 LBS | SYSTOLIC BLOOD PRESSURE: 130 MMHG

## 2023-11-24 DIAGNOSIS — N39.0 BACTERIAL UTI: ICD-10-CM

## 2023-11-24 DIAGNOSIS — A49.9 BACTERIAL UTI: ICD-10-CM

## 2023-11-24 DIAGNOSIS — R30.0 DYSURIA: Primary | ICD-10-CM

## 2023-11-24 LAB
BILIRUBIN, URINE, POC: NEGATIVE
BLOOD URINE, POC: NEGATIVE
GLUCOSE URINE, POC: NEGATIVE
KETONES, URINE, POC: NEGATIVE
LEUKOCYTE ESTERASE, URINE, POC: ABNORMAL
NITRITE, URINE, POC: NEGATIVE
PH, URINE, POC: 7.5 (ref 4.6–8)
PROTEIN,URINE, POC: NEGATIVE
SPECIFIC GRAVITY, URINE, POC: 1.02 (ref 1–1.03)
URINALYSIS CLARITY, POC: CLEAR
URINALYSIS COLOR, POC: YELLOW
UROBILINOGEN, POC: ABNORMAL

## 2023-11-24 PROCEDURE — 99213 OFFICE O/P EST LOW 20 MIN: CPT | Performed by: NURSE PRACTITIONER

## 2023-11-24 PROCEDURE — 3075F SYST BP GE 130 - 139MM HG: CPT | Performed by: NURSE PRACTITIONER

## 2023-11-24 PROCEDURE — 81002 URINALYSIS NONAUTO W/O SCOPE: CPT | Performed by: NURSE PRACTITIONER

## 2023-11-24 PROCEDURE — 3078F DIAST BP <80 MM HG: CPT | Performed by: NURSE PRACTITIONER

## 2023-11-24 RX ORDER — NITROFURANTOIN 25; 75 MG/1; MG/1
100 CAPSULE ORAL 2 TIMES DAILY
Qty: 10 CAPSULE | Refills: 0 | Status: SHIPPED | OUTPATIENT
Start: 2023-11-24 | End: 2023-11-29

## 2023-11-24 ASSESSMENT — PATIENT HEALTH QUESTIONNAIRE - PHQ9
1. LITTLE INTEREST OR PLEASURE IN DOING THINGS: 0
SUM OF ALL RESPONSES TO PHQ QUESTIONS 1-9: 0
2. FEELING DOWN, DEPRESSED OR HOPELESS: 0
SUM OF ALL RESPONSES TO PHQ QUESTIONS 1-9: 0
SUM OF ALL RESPONSES TO PHQ QUESTIONS 1-9: 0
SUM OF ALL RESPONSES TO PHQ9 QUESTIONS 1 & 2: 0
SUM OF ALL RESPONSES TO PHQ QUESTIONS 1-9: 0

## 2023-12-08 ENCOUNTER — TRANSCRIBE ORDERS (OUTPATIENT)
Facility: HOSPITAL | Age: 58
End: 2023-12-08

## 2023-12-08 DIAGNOSIS — Z12.31 SCREENING MAMMOGRAM FOR BREAST CANCER: Primary | ICD-10-CM

## 2024-03-25 DIAGNOSIS — E78.2 MIXED HYPERLIPIDEMIA: ICD-10-CM

## 2024-03-25 RX ORDER — ROSUVASTATIN CALCIUM 5 MG/1
5 TABLET, COATED ORAL DAILY
Qty: 90 TABLET | Refills: 2 | Status: SHIPPED | OUTPATIENT
Start: 2024-03-25

## 2024-04-07 ENCOUNTER — HOSPITAL ENCOUNTER (EMERGENCY)
Facility: HOSPITAL | Age: 59
Discharge: HOME | End: 2024-04-07
Attending: EMERGENCY MEDICINE
Payer: COMMERCIAL

## 2024-04-07 ENCOUNTER — APPOINTMENT (OUTPATIENT)
Facility: HOSPITAL | Age: 59
End: 2024-04-07
Payer: COMMERCIAL

## 2024-04-07 VITALS
WEIGHT: 153 LBS | OXYGEN SATURATION: 100 % | HEIGHT: 67 IN | RESPIRATION RATE: 14 BRPM | HEART RATE: 66 BPM | DIASTOLIC BLOOD PRESSURE: 100 MMHG | SYSTOLIC BLOOD PRESSURE: 148 MMHG | BODY MASS INDEX: 24.01 KG/M2

## 2024-04-07 DIAGNOSIS — S92.125A CLOSED NONDISPLACED FRACTURE OF BODY OF LEFT TALUS, INITIAL ENCOUNTER: ICD-10-CM

## 2024-04-07 DIAGNOSIS — S82.892A CLOSED FRACTURE OF LEFT ANKLE, INITIAL ENCOUNTER: Primary | ICD-10-CM

## 2024-04-07 PROCEDURE — 99283 EMERGENCY DEPT VISIT LOW MDM: CPT

## 2024-04-07 PROCEDURE — 6370000000 HC RX 637 (ALT 250 FOR IP): Performed by: EMERGENCY MEDICINE

## 2024-04-07 PROCEDURE — 73610 X-RAY EXAM OF ANKLE: CPT

## 2024-04-07 RX ORDER — OXYCODONE HYDROCHLORIDE 5 MG/1
5 TABLET ORAL ONCE
Status: COMPLETED | OUTPATIENT
Start: 2024-04-07 | End: 2024-04-07

## 2024-04-07 RX ORDER — OXYCODONE HYDROCHLORIDE 5 MG/1
5 TABLET ORAL EVERY 6 HOURS PRN
Qty: 12 TABLET | Refills: 0 | Status: SHIPPED | OUTPATIENT
Start: 2024-04-07 | End: 2024-04-10

## 2024-04-07 RX ADMIN — OXYCODONE 5 MG: 5 TABLET ORAL at 07:38

## 2024-04-07 ASSESSMENT — PAIN SCALES - GENERAL
PAINLEVEL_OUTOF10: 10
PAINLEVEL_OUTOF10: 10

## 2024-04-07 ASSESSMENT — PAIN DESCRIPTION - FREQUENCY: FREQUENCY: CONTINUOUS

## 2024-04-07 ASSESSMENT — PAIN DESCRIPTION - DESCRIPTORS
DESCRIPTORS: ACHING
DESCRIPTORS: ACHING

## 2024-04-07 ASSESSMENT — PAIN DESCRIPTION - PAIN TYPE: TYPE: ACUTE PAIN

## 2024-04-07 ASSESSMENT — PAIN DESCRIPTION - LOCATION
LOCATION: ANKLE

## 2024-04-07 ASSESSMENT — PAIN DESCRIPTION - ORIENTATION
ORIENTATION: LEFT

## 2024-04-07 ASSESSMENT — PAIN - FUNCTIONAL ASSESSMENT
PAIN_FUNCTIONAL_ASSESSMENT: PREVENTS OR INTERFERES WITH MANY ACTIVE NOT PASSIVE ACTIVITIES
PAIN_FUNCTIONAL_ASSESSMENT: 0-10

## 2024-04-07 ASSESSMENT — PAIN DESCRIPTION - ONSET: ONSET: SUDDEN

## 2024-04-07 ASSESSMENT — LIFESTYLE VARIABLES
HOW MANY STANDARD DRINKS CONTAINING ALCOHOL DO YOU HAVE ON A TYPICAL DAY: 1 OR 2
HOW OFTEN DO YOU HAVE A DRINK CONTAINING ALCOHOL: 2-3 TIMES A WEEK

## 2024-04-07 NOTE — DISCHARGE INSTRUCTIONS
Please keep your foot elevated and avoid any weightbearing on your left leg.    I am sending oxycodone 5 mg tablets to your pharmacy.  Take oxycodone only for severe pain, you can take it up to every 6 hours.  You can also take Tylenol 650 or ibuprofen 600 every 6 hours for pain.    Follow-up with your orthopedic doctor.

## 2024-04-07 NOTE — FLOWSHEET NOTE
Pt has own crutches,   Written and verbal discharge instructions reviewed with patient. All discharge medications reviewed and explained. Understanding verbalized, all questions answered.to car via

## 2024-04-07 NOTE — ED PROVIDER NOTES
fracture of left ankle, initial encounter    2. Closed nondisplaced fracture of body of left talus, initial encounter          DISPOSITION/PLAN   Xenia Bowers's  results have been reviewed with her.  She has been counseled regarding her diagnosis, treatment, and plan.  She verbally conveys understanding and agreement of the signs, symptoms, diagnosis, treatment and prognosis and additionally agrees to follow up as discussed.  She also agrees with the care-plan and conveys that all of her questions have been answered.  I have also provided discharge instructions for her that include: educational information regarding their diagnosis and treatment, and list of reasons why they would want to return to the ED prior to their follow-up appointment, should her condition change.     CLINICAL IMPRESSION    Discharge Note: The patient is stable for discharge home. The signs, symptoms, diagnosis, and discharge instructions have been discussed, understanding conveyed, and agreed upon. The patient is to follow up as recommended or return to ER should their symptoms worsen.      PATIENT REFERRED TO:  Wolf Lozada MD  [address] [place], [place] [id number] [phone]    Schedule an appointment as soon as possible for a visit          DISCHARGE MEDICATIONS:     Medication List        START taking these medications      oxyCODONE 5 MG immediate release tablet  Commonly known as: Roxicodone  Take 1 tablet by mouth every 6 hours as needed for Pain for up to 3 days. Intended supply: 3 days. Take lowest dose possible to manage pain Max Daily Amount: 20 mg            ASK your doctor about these medications      acetaminophen 325 MG tablet  Commonly known as: TYLENOL     diphenhydrAMINE 25 MG capsule  Commonly known as: BENADRYL     ibuprofen 200 MG tablet  Commonly known as: ADVIL;MOTRIN     loratadine 10 MG tablet  Commonly known as: CLARITIN     metoprolol succinate 100 MG extended release tablet  Commonly known as: TOPROL

## 2024-04-22 ENCOUNTER — HOSPITAL ENCOUNTER (OUTPATIENT)
Facility: HOSPITAL | Age: 59
Discharge: HOME | End: 2024-04-25
Payer: COMMERCIAL

## 2024-04-22 ENCOUNTER — TRANSCRIBE ORDERS (OUTPATIENT)
Facility: HOSPITAL | Age: 59
End: 2024-04-22

## 2024-04-22 DIAGNOSIS — M79.672 PAIN IN LEFT FOOT: Primary | ICD-10-CM

## 2024-04-22 DIAGNOSIS — M79.662 BILATERAL CALF PAIN: Primary | ICD-10-CM

## 2024-04-22 DIAGNOSIS — M79.661 BILATERAL CALF PAIN: Primary | ICD-10-CM

## 2024-04-22 DIAGNOSIS — M25.572 LEFT ANKLE PAIN, UNSPECIFIED CHRONICITY: Primary | ICD-10-CM

## 2024-04-22 DIAGNOSIS — M79.662 BILATERAL CALF PAIN: ICD-10-CM

## 2024-04-22 DIAGNOSIS — M25.572 ACUTE LEFT ANKLE PAIN: ICD-10-CM

## 2024-04-22 DIAGNOSIS — M79.672 PAIN IN LEFT FOOT: ICD-10-CM

## 2024-04-22 DIAGNOSIS — M79.661 BILATERAL CALF PAIN: ICD-10-CM

## 2024-04-22 PROCEDURE — 73630 X-RAY EXAM OF FOOT: CPT

## 2024-04-22 PROCEDURE — 73610 X-RAY EXAM OF ANKLE: CPT

## 2024-04-22 PROCEDURE — 73590 X-RAY EXAM OF LOWER LEG: CPT

## 2024-04-29 ENCOUNTER — HOSPITAL ENCOUNTER (OUTPATIENT)
Facility: HOSPITAL | Age: 59
Discharge: HOME | End: 2024-05-02
Payer: COMMERCIAL

## 2024-04-29 ENCOUNTER — TRANSCRIBE ORDERS (OUTPATIENT)
Facility: HOSPITAL | Age: 59
End: 2024-04-29

## 2024-04-29 DIAGNOSIS — S82.65XA CLOSED NONDISPLACED FRACTURE OF LATERAL MALLEOLUS OF LEFT FIBULA, INITIAL ENCOUNTER: Primary | ICD-10-CM

## 2024-04-29 DIAGNOSIS — S92.115A CLOSED NONDISPLACED FRACTURE OF NECK OF LEFT TALUS, INITIAL ENCOUNTER: ICD-10-CM

## 2024-04-29 DIAGNOSIS — S82.65XA CLOSED NONDISPLACED FRACTURE OF LATERAL MALLEOLUS OF LEFT FIBULA, INITIAL ENCOUNTER: ICD-10-CM

## 2024-04-29 PROCEDURE — 73610 X-RAY EXAM OF ANKLE: CPT

## 2024-04-29 PROCEDURE — 73630 X-RAY EXAM OF FOOT: CPT

## 2024-04-30 ENCOUNTER — OFFICE VISIT (OUTPATIENT)
Age: 59
End: 2024-04-30
Payer: COMMERCIAL

## 2024-04-30 VITALS
TEMPERATURE: 97.8 F | SYSTOLIC BLOOD PRESSURE: 139 MMHG | DIASTOLIC BLOOD PRESSURE: 91 MMHG | HEART RATE: 75 BPM | RESPIRATION RATE: 18 BRPM | OXYGEN SATURATION: 97 %

## 2024-04-30 DIAGNOSIS — Z78.0 POSTMENOPAUSAL: ICD-10-CM

## 2024-04-30 DIAGNOSIS — S82.92XA: ICD-10-CM

## 2024-04-30 DIAGNOSIS — I82.462 ACUTE DEEP VEIN THROMBOSIS (DVT) OF CALF MUSCLE VEIN OF LEFT LOWER EXTREMITY (HCC): Primary | ICD-10-CM

## 2024-04-30 PROCEDURE — 3075F SYST BP GE 130 - 139MM HG: CPT | Performed by: NURSE PRACTITIONER

## 2024-04-30 PROCEDURE — 99214 OFFICE O/P EST MOD 30 MIN: CPT | Performed by: NURSE PRACTITIONER

## 2024-04-30 PROCEDURE — 3080F DIAST BP >= 90 MM HG: CPT | Performed by: NURSE PRACTITIONER

## 2024-04-30 RX ORDER — APIXABAN 5 MG/1
5 TABLET, FILM COATED ORAL 2 TIMES DAILY
COMMUNITY
Start: 2024-04-13 | End: 2024-04-30

## 2024-04-30 ASSESSMENT — PATIENT HEALTH QUESTIONNAIRE - PHQ9
SUM OF ALL RESPONSES TO PHQ9 QUESTIONS 1 & 2: 0
SUM OF ALL RESPONSES TO PHQ QUESTIONS 1-9: 0
1. LITTLE INTEREST OR PLEASURE IN DOING THINGS: NOT AT ALL
SUM OF ALL RESPONSES TO PHQ QUESTIONS 1-9: 0
2. FEELING DOWN, DEPRESSED OR HOPELESS: NOT AT ALL

## 2024-04-30 NOTE — PROGRESS NOTES
Xenia Bowers is a 59 y.o. female presenting for/with:    Chief Complaint   Patient presents with    Follow-up     On 4/7 patient fell down her steps and went to [facility]... diagnosed with 2 fractures of her L ankle ... Drove to [place] and started to see discoloration and warmth of her calf .. Went to [facility] [place] [facility] and diagnosed with a fibula fracture as well as blood clot and placed on Eliquis .. She is seeing [facility] for Ortho care        Vitals:    04/30/24 0911   BP: (!) 139/91   Site: Left Upper Arm   Position: Sitting   Pulse: 75   Resp: 18   Temp: 97.8 °F (36.6 °C)   TempSrc: Temporal   SpO2: 97%       Pain Scale: 0 - No pain/10  Pain Location:     \"Have you been to the ER, urgent care clinic since your last visit?  Hospitalized since your last visit?\"    Yes --4/7/2024 [facility]     “Have you seen or consulted any other health care providers outside of [facility] since your last visit?”    NO                 4/30/2024     9:12 AM   PHQ-9    Little interest or pleasure in doing things 0   Feeling down, depressed, or hopeless 0   PHQ-2 Score 0   PHQ-9 Total Score 0           6/23/2023    10:20 AM 4/29/2022    12:00 AM 9/28/2021    12:00 AM   [facility] AMB LEARNING ASSESSMENT   Primary Learner Patient Patient Patient   Primary Language ENGLISH ENGLISH ENGLISH   Learning Preference DEMONSTRATION    VIDEOS READING READING   Answered By self pt patient   Relationship to Learner SELF SELF SELF            4/29/2022     2:00 PM   Amb Fall Risk Assessment and TUG Test   Fall in past 12 months? 0   Able to walk? Yes           4/30/2024     9:00 AM 11/24/2023    11:00 AM 6/23/2023    10:00 AM   ADL ASSESSMENT   Feeding yourself No Help Needed No Help Needed No Help Needed   Getting from bed to chair No Help Needed No Help Needed No Help Needed   Getting dressed No Help Needed No Help Needed No Help Needed   Bathing or showering No Help Needed No Help Needed No Help Needed   Walk across the room (includes cane/walker) No 
tablet 2    metoprolol succinate (TOPROL XL) 100 MG extended release tablet Take 1 tablet by mouth daily 90 tablet 4    traZODone (DESYREL) 100 MG tablet TAKE 1 TABLET NIGHTLY 90 tablet 1    triamcinolone (NASACORT) 55 MCG/ACT nasal inhaler 2 sprays by Each Nostril route daily      Milk Thistle 250 MG CAPS Take by mouth      acetaminophen (TYLENOL) 325 MG tablet Take by mouth every 4 hours as needed      diphenhydrAMINE (BENADRYL) 25 MG capsule Take 1 capsule by mouth every 6 hours as needed      loratadine (CLARITIN) 10 MG tablet Take 1 tablet by mouth      ibuprofen (ADVIL;MOTRIN) 200 MG tablet Take by mouth every 6 hours as needed (Patient not taking: Reported on 2024)       No current facility-administered medications for this visit.        Past Medical History:   Diagnosis Date    Endometrial hyperplasia     S/P D&C .      Essential hypertension     Hiatal hernia     Menopause     Migraine     Osteoarthritis     Ruptured appendix     Age 30    Skin cancer     Sun-damaged skin     Sunburn, blistering     Tanning bed exposure        Past Surgical History:   Procedure Laterality Date    APPENDECTOMY      COLONOSCOPY N/A 2023    COLONOSCOPY WITH HOT SNARE POLYPECTOMY performed by Saira Espinal MD at [facility] MAIN OR    GYN      D& C 2016       Social History     Socioeconomic History    Marital status:      Spouse name: None    Number of children: None    Years of education: None    Highest education level: None   Tobacco Use    Smoking status: Former     Current packs/day: 0.00     Average packs/day: 0.5 packs/day for 12.2 years (6.1 ttl pk-yrs)     Types: Cigarettes     Start date:      Quit date: 3/1/1996     Years since quittin.1    Smokeless tobacco: Never   Vaping Use    Vaping Use: Never used   Substance and Sexual Activity    Alcohol use: Yes     Alcohol/week: 14.0 standard drinks of alcohol     Types: 7 Glasses of wine, 7 Drinks containing 0.5 oz of alcohol per week    Drug

## 2024-05-07 ENCOUNTER — HOSPITAL ENCOUNTER (OUTPATIENT)
Facility: HOSPITAL | Age: 59
Discharge: HOME | End: 2024-05-10
Payer: COMMERCIAL

## 2024-05-07 DIAGNOSIS — Z78.0 POSTMENOPAUSAL: ICD-10-CM

## 2024-05-07 PROCEDURE — 77080 DXA BONE DENSITY AXIAL: CPT

## 2024-05-20 ENCOUNTER — HOSPITAL ENCOUNTER (OUTPATIENT)
Facility: HOSPITAL | Age: 59
Discharge: HOME | End: 2024-05-23
Payer: COMMERCIAL

## 2024-05-20 ENCOUNTER — TRANSCRIBE ORDERS (OUTPATIENT)
Facility: HOSPITAL | Age: 59
End: 2024-05-20

## 2024-05-20 DIAGNOSIS — S92.115S: ICD-10-CM

## 2024-05-20 DIAGNOSIS — S92.115S: Primary | ICD-10-CM

## 2024-05-20 DIAGNOSIS — S82.65XE: ICD-10-CM

## 2024-05-20 DIAGNOSIS — S82.65XE: Primary | ICD-10-CM

## 2024-05-20 PROCEDURE — 73610 X-RAY EXAM OF ANKLE: CPT

## 2024-05-20 PROCEDURE — 73630 X-RAY EXAM OF FOOT: CPT

## 2024-06-05 RX ORDER — TRAZODONE HYDROCHLORIDE 100 MG/1
100 TABLET ORAL NIGHTLY
Qty: 90 TABLET | Refills: 1 | Status: SHIPPED | OUTPATIENT
Start: 2024-06-05

## 2024-06-10 ENCOUNTER — TRANSCRIBE ORDERS (OUTPATIENT)
Facility: HOSPITAL | Age: 59
End: 2024-06-10

## 2024-06-10 ENCOUNTER — HOSPITAL ENCOUNTER (OUTPATIENT)
Facility: HOSPITAL | Age: 59
Discharge: HOME | End: 2024-06-13
Payer: COMMERCIAL

## 2024-06-10 DIAGNOSIS — S82.65XS: ICD-10-CM

## 2024-06-10 DIAGNOSIS — S82.65XS: Primary | ICD-10-CM

## 2024-06-10 PROCEDURE — 73610 X-RAY EXAM OF ANKLE: CPT

## 2024-07-01 ENCOUNTER — HOSPITAL ENCOUNTER (OUTPATIENT)
Facility: HOSPITAL | Age: 59
Discharge: HOME | End: 2024-07-04
Payer: COMMERCIAL

## 2024-07-01 ENCOUNTER — TRANSCRIBE ORDERS (OUTPATIENT)
Facility: HOSPITAL | Age: 59
End: 2024-07-01

## 2024-07-01 DIAGNOSIS — M79.662 PAIN IN LEFT SHIN: ICD-10-CM

## 2024-07-01 DIAGNOSIS — M79.662 PAIN IN LEFT SHIN: Primary | ICD-10-CM

## 2024-07-01 DIAGNOSIS — S82.65XA CLOSED NONDISPLACED FRACTURE OF LATERAL MALLEOLUS OF LEFT FIBULA, INITIAL ENCOUNTER: Primary | ICD-10-CM

## 2024-07-01 DIAGNOSIS — S82.65XA CLOSED NONDISPLACED FRACTURE OF LATERAL MALLEOLUS OF LEFT FIBULA, INITIAL ENCOUNTER: ICD-10-CM

## 2024-07-01 PROCEDURE — 73590 X-RAY EXAM OF LOWER LEG: CPT

## 2024-07-01 PROCEDURE — 73630 X-RAY EXAM OF FOOT: CPT

## 2024-07-01 PROCEDURE — 73610 X-RAY EXAM OF ANKLE: CPT

## 2024-07-09 ENCOUNTER — HOSPITAL ENCOUNTER (OUTPATIENT)
Facility: HOSPITAL | Age: 59
Discharge: HOME | End: 2024-07-12
Attending: ORTHOPAEDIC SURGERY
Payer: COMMERCIAL

## 2024-07-09 DIAGNOSIS — S92.115A NONDISPLACED FRACTURE OF NECK OF LEFT TALUS, INITIAL ENCOUNTER FOR CLOSED FRACTURE: ICD-10-CM

## 2024-07-09 PROCEDURE — 73700 CT LOWER EXTREMITY W/O DYE: CPT

## 2024-07-10 ENCOUNTER — TELEPHONE (OUTPATIENT)
Age: 59
End: 2024-07-10

## 2024-07-10 DIAGNOSIS — I82.462 ACUTE DEEP VEIN THROMBOSIS (DVT) OF CALF MUSCLE VEIN OF LEFT LOWER EXTREMITY (HCC): Primary | ICD-10-CM

## 2024-07-10 NOTE — TELEPHONE ENCOUNTER
----- Message from GEOFF Boucher NP sent at 7/10/2024 12:58 PM EDT -----  Regarding: FW: Updated Doppler of left calf  Contact: [phone]  Ok to place order. Can you let her know once order is placed?  ----- Message -----  From: Tami Agrawal MA  Sent: 7/10/2024  10:24 AM EDT  To: GEOFF Boucher NP  Subject: FW: Updated Doppler of left calf                 Do you want to see her first or can I just place order for this?  ----- Message -----  From: Xenia Bowers  Sent: 7/10/2024  10:09 AM EDT  To: #  Subject: Updated Doppler of left calf                     Hi Janiya, when I saw you back in April after my ankle fractures and DVT you said you wanted to see an updated doppler in 3 months. Is that something I have done before I see you or do I see you first? If before can you put in an order for it? I'd love to get off Eliquis as soon as I can. Thank you!

## 2024-07-12 DIAGNOSIS — G57.92 NEURITIS OF LEFT ANKLE: Primary | ICD-10-CM

## 2024-07-12 RX ORDER — GABAPENTIN 100 MG/1
100 CAPSULE ORAL 3 TIMES DAILY
Qty: 90 CAPSULE | Refills: 5 | Status: SHIPPED | OUTPATIENT
Start: 2024-07-12 | End: 2025-01-08

## 2024-07-12 NOTE — PROGRESS NOTES
She is having neuritic pain in the left ankle with probable complex regional pain syndrome following fibular and talar neck fracture.

## 2024-07-24 ENCOUNTER — HOSPITAL ENCOUNTER (OUTPATIENT)
Facility: HOSPITAL | Age: 59
Discharge: HOME | End: 2024-07-27
Payer: COMMERCIAL

## 2024-07-24 DIAGNOSIS — I82.462 ACUTE DEEP VEIN THROMBOSIS (DVT) OF CALF MUSCLE VEIN OF LEFT LOWER EXTREMITY (HCC): ICD-10-CM

## 2024-07-24 PROCEDURE — 93971 EXTREMITY STUDY: CPT

## 2024-08-06 DIAGNOSIS — I10 ESSENTIAL HYPERTENSION: ICD-10-CM

## 2024-08-06 RX ORDER — METOPROLOL SUCCINATE 100 MG/1
100 TABLET, EXTENDED RELEASE ORAL DAILY
Qty: 90 TABLET | Refills: 3 | Status: SHIPPED | OUTPATIENT
Start: 2024-08-06

## 2024-10-28 RX ORDER — TRAZODONE HYDROCHLORIDE 100 MG/1
100 TABLET ORAL NIGHTLY
Qty: 90 TABLET | Refills: 0 | Status: SHIPPED | OUTPATIENT
Start: 2024-10-28

## 2024-11-20 RX ORDER — TRAZODONE HYDROCHLORIDE 100 MG/1
100 TABLET ORAL NIGHTLY
Qty: 90 TABLET | Refills: 0 | Status: SHIPPED | OUTPATIENT
Start: 2024-11-20

## 2025-01-27 RX ORDER — TRAZODONE HYDROCHLORIDE 100 MG/1
100 TABLET ORAL NIGHTLY
Qty: 90 TABLET | Refills: 0 | Status: SHIPPED | OUTPATIENT
Start: 2025-01-27

## 2025-04-20 DIAGNOSIS — E78.2 MIXED HYPERLIPIDEMIA: ICD-10-CM

## 2025-04-21 RX ORDER — ROSUVASTATIN CALCIUM 5 MG/1
5 TABLET, COATED ORAL DAILY
Qty: 90 TABLET | Refills: 2 | OUTPATIENT
Start: 2025-04-21

## 2025-04-21 RX ORDER — TRAZODONE HYDROCHLORIDE 100 MG/1
100 TABLET ORAL NIGHTLY
Qty: 90 TABLET | Refills: 0 | OUTPATIENT
Start: 2025-04-21

## 2025-04-28 DIAGNOSIS — E78.2 MIXED HYPERLIPIDEMIA: ICD-10-CM

## 2025-04-28 RX ORDER — ROSUVASTATIN CALCIUM 5 MG/1
5 TABLET, COATED ORAL DAILY
Qty: 90 TABLET | Refills: 0 | Status: SHIPPED | OUTPATIENT
Start: 2025-04-28

## 2025-05-05 NOTE — PROGRESS NOTES
Chief Complaint   Patient presents with    Annual Exam     Wants routine labs         HPI:       is a 60 y.o. female.   to . Is very active and enjoys biking.     HTN: Well controlled on Metoprolol.  No side effects.    Insomnia: Fair control on Trazodone.  Usually only takes 1/2 tab.    IFG: Last A1c was 5.8%.   Hemoglobin A1C   Date Value Ref Range Status   06/23/2023 5.8 (H) 4.0 - 5.6 % Final     Comment:     NEW METHOD  PLEASE NOTE NEW REFERENCE RANGE  (NOTE)  HbA1C Interpretive Ranges  <5.7              Normal  5.7 - 6.4         Consider Prediabetes  >6.5              Consider Diabetes        She had a fall down her steps on 4/7/24.  Went to [facility] and was diagnosed with fractures of the lateral malleolus and mid talus.  She went to [facility] in [place] where she was diagnosed with a non-occlusive blood clot as well as an additional fibula fracture. Was managed by Dr. Lozada.  [facility] Diagnosed with osteopenia via Dexa and is taking calcium and vitamin D.     New Issues:  She has had continued issues with her left lower leg/foot. She has been seeing [facility] and is getting routine CT scans for concern for delayed healing of the left talar neck fracture.  She has been seeing Dr. Porter Oswald for her ankle/foot.  She has also been diagnosed with Complex Regional Pain Syndrome and was put on first Gabapentin which she did not tolerate well and then Cymbalta (9/2024).  Cymbalta has been very helpful. Seeing NP Crystal Navarro for this.  Her left foot is much cooler than the left and is darker in color.  Has not had ABIs. She will be getting another CT scan this month.  Osteopenia was noted to be diffuse and there is concern for additional stress fractures.     Allergies   Allergen Reactions    Latex      Reports mainly from dental procedure causes respiratory allergies     Azithromycin Hives    Doxylamine Hives       Current Outpatient Medications   Medication Sig

## 2025-05-10 ENCOUNTER — TRANSCRIBE ORDERS (OUTPATIENT)
Facility: HOSPITAL | Age: 60
End: 2025-05-10

## 2025-05-10 DIAGNOSIS — M79.672 ACUTE FOOT PAIN, LEFT: Primary | ICD-10-CM

## 2025-05-10 DIAGNOSIS — S92.355A NONDISPLACED FRACTURE OF FIFTH METATARSAL BONE, LEFT FOOT, INITIAL ENCOUNTER FOR CLOSED FRACTURE: ICD-10-CM

## 2025-05-10 DIAGNOSIS — S92.355A CLOSED NONDISPLACED FRACTURE OF FIFTH METATARSAL BONE OF LEFT FOOT, INITIAL ENCOUNTER: ICD-10-CM

## 2025-05-10 DIAGNOSIS — S92.115K: Primary | ICD-10-CM

## 2025-05-10 DIAGNOSIS — S92.115K: ICD-10-CM

## 2025-05-10 DIAGNOSIS — M79.672 ACUTE FOOT PAIN, LEFT: ICD-10-CM

## 2025-05-12 ENCOUNTER — TRANSCRIBE ORDERS (OUTPATIENT)
Facility: HOSPITAL | Age: 60
End: 2025-05-12

## 2025-05-12 DIAGNOSIS — Z12.31 OTHER SCREENING MAMMOGRAM: Primary | ICD-10-CM

## 2025-05-20 ENCOUNTER — OFFICE VISIT (OUTPATIENT)
Age: 60
End: 2025-05-20
Payer: COMMERCIAL

## 2025-05-20 VITALS
WEIGHT: 153.8 LBS | HEART RATE: 71 BPM | OXYGEN SATURATION: 96 % | TEMPERATURE: 98.4 F | RESPIRATION RATE: 18 BRPM | HEIGHT: 67 IN | DIASTOLIC BLOOD PRESSURE: 99 MMHG | SYSTOLIC BLOOD PRESSURE: 144 MMHG | BODY MASS INDEX: 24.14 KG/M2

## 2025-05-20 DIAGNOSIS — Z23 ENCOUNTER FOR IMMUNIZATION: ICD-10-CM

## 2025-05-20 DIAGNOSIS — S82.92XG: ICD-10-CM

## 2025-05-20 DIAGNOSIS — E55.9 VITAMIN D DEFICIENCY: ICD-10-CM

## 2025-05-20 DIAGNOSIS — G90.522 COMPLEX REGIONAL PAIN SYNDROME TYPE 1 OF LEFT LOWER EXTREMITY: ICD-10-CM

## 2025-05-20 DIAGNOSIS — I10 ESSENTIAL HYPERTENSION: Primary | ICD-10-CM

## 2025-05-20 DIAGNOSIS — M85.872 OSTEOPENIA OF LEFT FOOT: ICD-10-CM

## 2025-05-20 DIAGNOSIS — R73.01 IMPAIRED FASTING GLUCOSE: ICD-10-CM

## 2025-05-20 PROCEDURE — 3077F SYST BP >= 140 MM HG: CPT | Performed by: NURSE PRACTITIONER

## 2025-05-20 PROCEDURE — 3080F DIAST BP >= 90 MM HG: CPT | Performed by: NURSE PRACTITIONER

## 2025-05-20 PROCEDURE — 99396 PREV VISIT EST AGE 40-64: CPT | Performed by: NURSE PRACTITIONER

## 2025-05-20 PROCEDURE — 90471 IMMUNIZATION ADMIN: CPT | Performed by: NURSE PRACTITIONER

## 2025-05-20 PROCEDURE — 90750 HZV VACC RECOMBINANT IM: CPT | Performed by: NURSE PRACTITIONER

## 2025-05-20 RX ORDER — DULOXETIN HYDROCHLORIDE 60 MG/1
60 CAPSULE, DELAYED RELEASE ORAL DAILY
COMMUNITY

## 2025-05-20 RX ORDER — IBUPROFEN 200 MG
TABLET ORAL EVERY 6 HOURS PRN
COMMUNITY

## 2025-05-20 SDOH — ECONOMIC STABILITY: FOOD INSECURITY: WITHIN THE PAST 12 MONTHS, YOU WORRIED THAT YOUR FOOD WOULD RUN OUT BEFORE YOU GOT MONEY TO BUY MORE.: NEVER TRUE

## 2025-05-20 SDOH — ECONOMIC STABILITY: FOOD INSECURITY: WITHIN THE PAST 12 MONTHS, THE FOOD YOU BOUGHT JUST DIDN'T LAST AND YOU DIDN'T HAVE MONEY TO GET MORE.: NEVER TRUE

## 2025-05-20 ASSESSMENT — PATIENT HEALTH QUESTIONNAIRE - PHQ9
SUM OF ALL RESPONSES TO PHQ QUESTIONS 1-9: 0
1. LITTLE INTEREST OR PLEASURE IN DOING THINGS: NOT AT ALL
SUM OF ALL RESPONSES TO PHQ QUESTIONS 1-9: 0
2. FEELING DOWN, DEPRESSED OR HOPELESS: NOT AT ALL

## 2025-05-20 NOTE — PROGRESS NOTES
Xenia Bowers is a 60 y.o. female presenting for/with:    Chief Complaint   Patient presents with    Annual Exam     Wants routine labs       Vitals:    05/20/25 0944   BP: (!) 144/99   Pulse: 71   Resp: 18   Temp: 98.4 °F (36.9 °C)   TempSrc: Temporal   SpO2: 96%   Weight: 69.8 kg (153 lb 12.8 oz)   Height: 1.702 m (5' 7\")       Pain Scale: 4/10  Pain Location: Foot    \"Have you been to the ER, urgent care clinic since your last visit?  Hospitalized since your last visit?\"    NO    “Have you seen or consulted any other health care providers outside of [facility] since your last visit?”    NO                 5/20/2025     9:42 AM   PHQ-9    Little interest or pleasure in doing things 0   Feeling down, depressed, or hopeless 0   PHQ-2 Score 0   PHQ-9 Total Score 0           6/23/2023    10:20 AM 4/29/2022    12:00 AM 9/28/2021    12:00 AM   [facility] AMB LEARNING ASSESSMENT   Primary Learner Patient Patient Patient   Primary Language ENGLISH ENGLISH ENGLISH   Learning Preference DEMONSTRATION    VIDEOS READING READING   Answered By self pt patient   Relationship to Learner SELF SELF SELF            5/20/2025     9:41 AM   Amb Fall Risk Assessment and TUG Test   Do you feel unsteady or are you worried about falling?  no   2 or more falls in past year? no   Fall with injury in past year? no           5/20/2025     9:00 AM 4/30/2024     9:00 AM 11/24/2023    11:00 AM 6/23/2023    10:00 AM   ADL ASSESSMENT   Feeding yourself No Help Needed No Help Needed No Help Needed No Help Needed   Getting from bed to chair No Help Needed No Help Needed No Help Needed No Help Needed   Getting dressed No Help Needed No Help Needed No Help Needed No Help Needed   Bathing or showering No Help Needed No Help Needed No Help Needed No Help Needed   Walk across the room (includes cane/walker) No Help Needed No Help Needed No Help Needed No Help Needed   Using the telphone No Help Needed No Help Needed No Help Needed No Help Needed

## 2025-05-21 ENCOUNTER — RESULTS FOLLOW-UP (OUTPATIENT)
Age: 60
End: 2025-05-21

## 2025-05-21 LAB
25(OH)D3+25(OH)D2 SERPL-MCNC: 72.7 NG/ML (ref 30–100)
ALBUMIN SERPL-MCNC: 4.6 G/DL (ref 3.9–4.9)
ALP SERPL-CCNC: 48 IU/L (ref 44–121)
ALT SERPL-CCNC: 16 IU/L (ref 0–32)
AST SERPL-CCNC: 17 IU/L (ref 0–40)
BASOPHILS # BLD AUTO: 0 X10E3/UL (ref 0–0.2)
BASOPHILS NFR BLD AUTO: 1 %
BILIRUB SERPL-MCNC: 0.5 MG/DL (ref 0–1.2)
BUN SERPL-MCNC: 17 MG/DL (ref 8–27)
BUN/CREAT SERPL: 26 (ref 12–28)
CALCIUM SERPL-MCNC: 9.7 MG/DL (ref 8.7–10.3)
CHLORIDE SERPL-SCNC: 102 MMOL/L (ref 96–106)
CHOLEST SERPL-MCNC: 206 MG/DL (ref 100–199)
CO2 SERPL-SCNC: 21 MMOL/L (ref 20–29)
CREAT SERPL-MCNC: 0.66 MG/DL (ref 0.57–1)
EGFRCR SERPLBLD CKD-EPI 2021: 100 ML/MIN/1.73
EOSINOPHIL # BLD AUTO: 0.1 X10E3/UL (ref 0–0.4)
EOSINOPHIL NFR BLD AUTO: 2 %
ERYTHROCYTE [DISTWIDTH] IN BLOOD BY AUTOMATED COUNT: 12.7 % (ref 11.7–15.4)
GLOBULIN SER CALC-MCNC: 2.3 G/DL (ref 1.5–4.5)
GLUCOSE SERPL-MCNC: 129 MG/DL (ref 70–99)
HBA1C MFR BLD: 6.3 % (ref 4.8–5.6)
HCT VFR BLD AUTO: 44.9 % (ref 34–46.6)
HDLC SERPL-MCNC: 85 MG/DL
HGB BLD-MCNC: 14.6 G/DL (ref 11.1–15.9)
IMM GRANULOCYTES # BLD AUTO: 0 X10E3/UL (ref 0–0.1)
IMM GRANULOCYTES NFR BLD AUTO: 0 %
LDLC SERPL CALC-MCNC: 110 MG/DL (ref 0–99)
LYMPHOCYTES # BLD AUTO: 1.4 X10E3/UL (ref 0.7–3.1)
LYMPHOCYTES NFR BLD AUTO: 32 %
MCH RBC QN AUTO: 31.1 PG (ref 26.6–33)
MCHC RBC AUTO-ENTMCNC: 32.5 G/DL (ref 31.5–35.7)
MCV RBC AUTO: 96 FL (ref 79–97)
MONOCYTES # BLD AUTO: 0.4 X10E3/UL (ref 0.1–0.9)
MONOCYTES NFR BLD AUTO: 8 %
NEUTROPHILS # BLD AUTO: 2.5 X10E3/UL (ref 1.4–7)
NEUTROPHILS NFR BLD AUTO: 57 %
PLATELET # BLD AUTO: 285 X10E3/UL (ref 150–450)
POTASSIUM SERPL-SCNC: 4.5 MMOL/L (ref 3.5–5.2)
RBC # BLD AUTO: 4.7 X10E6/UL (ref 3.77–5.28)
SODIUM SERPL-SCNC: 139 MMOL/L (ref 134–144)
TRIGL SERPL-MCNC: 60 MG/DL (ref 0–149)
TSH SERPL DL<=0.005 MIU/L-ACNC: 2.25 UIU/ML (ref 0.45–4.5)
VLDLC SERPL CALC-MCNC: 11 MG/DL (ref 5–40)
WBC # BLD AUTO: 4.3 X10E3/UL (ref 3.4–10.8)

## 2025-05-23 LAB
ALBUMIN SERPL ELPH-MCNC: 4.1 G/DL (ref 2.9–4.4)
ALBUMIN/GLOB SERPL: 1.5 {RATIO} (ref 0.7–1.7)
ALPHA1 GLOB SERPL ELPH-MCNC: 0.2 G/DL (ref 0–0.4)
ALPHA2 GLOB SERPL ELPH-MCNC: 0.6 G/DL (ref 0.4–1)
B-GLOBULIN SERPL ELPH-MCNC: 1.2 G/DL (ref 0.7–1.3)
GAMMA GLOB SERPL ELPH-MCNC: 0.8 G/DL (ref 0.4–1.8)
GLOBULIN SER-MCNC: 2.8 G/DL (ref 2.2–3.9)
IGA SERPL-MCNC: 202 MG/DL (ref 87–352)
IGG SERPL-MCNC: 892 MG/DL (ref 586–1602)
IGM SERPL-MCNC: 53 MG/DL (ref 26–217)
INTERPRETATION SERPL IEP-IMP: ABNORMAL
KAPPA LC FREE SER-MCNC: 7.2 MG/L (ref 3.3–19.4)
KAPPA LC FREE/LAMBDA FREE SER: 1.85 {RATIO} (ref 0.26–1.65)
LABORATORY COMMENT REPORT: ABNORMAL
LAMBDA LC FREE SERPL-MCNC: 3.9 MG/L (ref 5.7–26.3)
M PROTEIN SERPL ELPH-MCNC: ABNORMAL G/DL
PROT SERPL-MCNC: 6.9 G/DL (ref 6–8.5)

## 2025-05-29 ENCOUNTER — HOSPITAL ENCOUNTER (OUTPATIENT)
Facility: HOSPITAL | Age: 60
Discharge: HOME | End: 2025-05-29
Payer: COMMERCIAL

## 2025-05-29 DIAGNOSIS — S92.355A CLOSED NONDISPLACED FRACTURE OF FIFTH METATARSAL BONE OF LEFT FOOT, INITIAL ENCOUNTER: ICD-10-CM

## 2025-05-29 DIAGNOSIS — S92.355A NONDISPLACED FRACTURE OF FIFTH METATARSAL BONE, LEFT FOOT, INITIAL ENCOUNTER FOR CLOSED FRACTURE: ICD-10-CM

## 2025-05-29 DIAGNOSIS — M79.672 ACUTE FOOT PAIN, LEFT: ICD-10-CM

## 2025-05-29 DIAGNOSIS — S92.115K: ICD-10-CM

## 2025-05-29 PROCEDURE — 73700 CT LOWER EXTREMITY W/O DYE: CPT

## 2025-06-03 ENCOUNTER — TRANSCRIBE ORDERS (OUTPATIENT)
Facility: HOSPITAL | Age: 60
End: 2025-06-03

## 2025-06-03 DIAGNOSIS — M84.30XA STRESS REACTION OF BONE: Primary | ICD-10-CM

## 2025-06-04 ENCOUNTER — HOSPITAL ENCOUNTER (OUTPATIENT)
Facility: HOSPITAL | Age: 60
Discharge: HOME | End: 2025-06-07
Payer: COMMERCIAL

## 2025-06-04 DIAGNOSIS — Z12.31 OTHER SCREENING MAMMOGRAM: ICD-10-CM

## 2025-06-04 PROCEDURE — 77063 BREAST TOMOSYNTHESIS BI: CPT

## 2025-06-23 ENCOUNTER — HOSPITAL ENCOUNTER (OUTPATIENT)
Facility: HOSPITAL | Age: 60
Discharge: HOME | End: 2025-06-26
Attending: ORTHOPAEDIC SURGERY
Payer: COMMERCIAL

## 2025-06-23 DIAGNOSIS — M84.30XA STRESS REACTION OF BONE: ICD-10-CM

## 2025-06-23 PROCEDURE — 73718 MRI LOWER EXTREMITY W/O DYE: CPT

## 2025-07-16 DIAGNOSIS — E78.2 MIXED HYPERLIPIDEMIA: ICD-10-CM

## 2025-07-16 RX ORDER — ROSUVASTATIN CALCIUM 5 MG/1
5 TABLET, COATED ORAL DAILY
Qty: 90 TABLET | Refills: 3 | Status: SHIPPED | OUTPATIENT
Start: 2025-07-16

## 2025-07-17 DIAGNOSIS — I10 ESSENTIAL HYPERTENSION: ICD-10-CM

## 2025-07-17 RX ORDER — METOPROLOL SUCCINATE 100 MG/1
100 TABLET, EXTENDED RELEASE ORAL DAILY
Qty: 90 TABLET | Refills: 3 | Status: SHIPPED | OUTPATIENT
Start: 2025-07-17

## (undated) DEVICE — ELECTRODE PT RET AD L9FT HI MOIST COND ADH HYDRGEL CORDED

## (undated) DEVICE — LINER,SEMI-RIGID,3000CC,50EA/CS: Brand: MEDLINE

## (undated) DEVICE — SNARE L240CM LOOP W27MM STIFF WIRE SHT THROW STD OVL M SENS

## (undated) DEVICE — GOWN,ISO,KNITCUFF, PREMIUM BLUE, LV3,XL: Brand: MEDLINE INDUSTRIES, INC.

## (undated) DEVICE — KIT ENDO OP4 CA 1.1+ BX20

## (undated) DEVICE — BLUNT CANNULA: Brand: MONOJECT

## (undated) DEVICE — SYRINGE 20ML LL S/C 50

## (undated) DEVICE — TRAP POLYP 1 CHMBR WIDE EYE

## (undated) DEVICE — SOLUTION IRRIG 1000ML STRL H2O USP PLAS POUR BTL